# Patient Record
Sex: FEMALE | Race: BLACK OR AFRICAN AMERICAN | NOT HISPANIC OR LATINO | Employment: STUDENT | RURAL
[De-identification: names, ages, dates, MRNs, and addresses within clinical notes are randomized per-mention and may not be internally consistent; named-entity substitution may affect disease eponyms.]

---

## 2018-09-19 DIAGNOSIS — Z11.1 SCREENING-PULMONARY TB: Primary | ICD-10-CM

## 2018-09-23 LAB
GAMMA INTERFERON BACKGROUND BLD IA-ACNC: 0.04 IU/ML
M TB IFN-G BLD-IMP: NEGATIVE
M TB IFN-G CD4+ T-CELLS BLD-ACNC: 0.03 IU/ML
M TB IFN-G CD4+ T-CELLS BLD-ACNC: 0.04 IU/ML
MITOGEN IGNF BLD-ACNC: >10 IU/ML
QUANTIFERON INCUBATION: NORMAL
SERVICE CMNT-IMP: NORMAL

## 2019-04-11 DIAGNOSIS — Z11.1 SCREENING-PULMONARY TB: Primary | ICD-10-CM

## 2019-04-12 ENCOUNTER — RESULTS ENCOUNTER (OUTPATIENT)
Dept: FAMILY MEDICINE CLINIC | Facility: CLINIC | Age: 19
End: 2019-04-12

## 2019-04-12 DIAGNOSIS — Z11.1 SCREENING-PULMONARY TB: ICD-10-CM

## 2019-04-17 ENCOUNTER — TELEPHONE (OUTPATIENT)
Dept: FAMILY MEDICINE CLINIC | Facility: CLINIC | Age: 19
End: 2019-04-17

## 2019-04-17 PROBLEM — Z00.00 WELLNESS EXAMINATION: Status: ACTIVE | Noted: 2019-04-17

## 2019-04-17 PROBLEM — I38 VALVULAR HEART DISEASE: Status: ACTIVE | Noted: 2019-04-17

## 2019-04-17 LAB
GAMMA INTERFERON BACKGROUND BLD IA-ACNC: 0.03 IU/ML
M TB IFN-G BLD-IMP: NEGATIVE
M TB IFN-G CD4+ BCKGRND COR BLD-ACNC: 0.02 IU/ML
MITOGEN IGNF BLD-ACNC: 8.82 IU/ML
QUANTIFERON INCUBATION: NORMAL
QUANTIFERON TB2 AG VALUE: 0.02 IU/ML
SERVICE CMNT-IMP: NORMAL

## 2019-04-17 NOTE — TELEPHONE ENCOUNTER
Spoke with pt mother, informed of negative results and sent Cami an email verification to activate [facility] to view the results

## 2019-04-17 NOTE — TELEPHONE ENCOUNTER
----- Message from Esteban Wooten MD sent at 4/17/2019  8:01 AM CDT -----  This is neg  Not sure if she has an issue/not?  May just need copy of this for her work/?

## 2023-06-26 ENCOUNTER — TELEPHONE (OUTPATIENT)
Dept: FAMILY MEDICINE CLINIC | Facility: CLINIC | Age: 23
End: 2023-06-26

## 2024-02-09 ENCOUNTER — OFFICE VISIT (OUTPATIENT)
Dept: FAMILY MEDICINE CLINIC | Facility: CLINIC | Age: 24
End: 2024-02-09
Payer: COMMERCIAL

## 2024-02-09 VITALS
WEIGHT: 189.8 LBS | TEMPERATURE: 96.9 F | DIASTOLIC BLOOD PRESSURE: 90 MMHG | RESPIRATION RATE: 18 BRPM | HEIGHT: 72 IN | OXYGEN SATURATION: 98 % | HEART RATE: 84 BPM | SYSTOLIC BLOOD PRESSURE: 140 MMHG | BODY MASS INDEX: 25.71 KG/M2

## 2024-02-09 DIAGNOSIS — Z00.00 WELLNESS EXAMINATION: ICD-10-CM

## 2024-02-09 DIAGNOSIS — Z01.419 ROUTINE GYNECOLOGICAL EXAMINATION: ICD-10-CM

## 2024-02-09 DIAGNOSIS — F41.8 DEPRESSION WITH ANXIETY: Primary | ICD-10-CM

## 2024-02-09 RX ORDER — ESCITALOPRAM OXALATE 10 MG/1
10 TABLET ORAL DAILY
Qty: 30 TABLET | Refills: 5 | Status: SHIPPED | OUTPATIENT
Start: 2024-02-09

## 2024-02-09 RX ORDER — NORETHINDRONE ACETATE AND ETHINYL ESTRADIOL, ETHINYL ESTRADIOL AND FERROUS FUMARATE 1MG-10(24)
1 KIT ORAL DAILY
Qty: 28 TABLET | Refills: 5 | Status: SHIPPED | OUTPATIENT
Start: 2024-02-09

## 2024-02-10 LAB
ALBUMIN SERPL-MCNC: 5.1 G/DL (ref 3.5–5.2)
ALBUMIN/GLOB SERPL: 2 G/DL
ALP SERPL-CCNC: 48 U/L (ref 39–117)
ALT SERPL-CCNC: 15 U/L (ref 1–33)
AST SERPL-CCNC: 21 U/L (ref 1–32)
BASOPHILS # BLD AUTO: 0.05 10*3/MM3 (ref 0–0.2)
BASOPHILS NFR BLD AUTO: 0.7 % (ref 0–1.5)
BILIRUB SERPL-MCNC: 0.6 MG/DL (ref 0–1.2)
BUN SERPL-MCNC: 14 MG/DL (ref 6–20)
BUN/CREAT SERPL: 14.4 (ref 7–25)
CALCIUM SERPL-MCNC: 9.4 MG/DL (ref 8.6–10.5)
CHLORIDE SERPL-SCNC: 103 MMOL/L (ref 98–107)
CHOLEST SERPL-MCNC: 138 MG/DL (ref 0–200)
CO2 SERPL-SCNC: 24.9 MMOL/L (ref 22–29)
CREAT SERPL-MCNC: 0.97 MG/DL (ref 0.57–1)
EGFRCR SERPLBLD CKD-EPI 2021: 83.3 ML/MIN/1.73
EOSINOPHIL # BLD AUTO: 0.03 10*3/MM3 (ref 0–0.4)
EOSINOPHIL NFR BLD AUTO: 0.4 % (ref 0.3–6.2)
ERYTHROCYTE [DISTWIDTH] IN BLOOD BY AUTOMATED COUNT: 13 % (ref 12.3–15.4)
GLOBULIN SER CALC-MCNC: 2.6 GM/DL
GLUCOSE SERPL-MCNC: 70 MG/DL (ref 65–99)
HCT VFR BLD AUTO: 33.7 % (ref 34–46.6)
HDLC SERPL-MCNC: 68 MG/DL (ref 40–60)
HGB BLD-MCNC: 11.1 G/DL (ref 12–15.9)
IMM GRANULOCYTES # BLD AUTO: 0.01 10*3/MM3 (ref 0–0.05)
IMM GRANULOCYTES NFR BLD AUTO: 0.1 % (ref 0–0.5)
LDLC SERPL CALC-MCNC: 62 MG/DL (ref 0–100)
LYMPHOCYTES # BLD AUTO: 2.01 10*3/MM3 (ref 0.7–3.1)
LYMPHOCYTES NFR BLD AUTO: 26.2 % (ref 19.6–45.3)
MCH RBC QN AUTO: 31 PG (ref 26.6–33)
MCHC RBC AUTO-ENTMCNC: 32.9 G/DL (ref 31.5–35.7)
MCV RBC AUTO: 94.1 FL (ref 79–97)
MONOCYTES # BLD AUTO: 0.57 10*3/MM3 (ref 0.1–0.9)
MONOCYTES NFR BLD AUTO: 7.4 % (ref 5–12)
NEUTROPHILS # BLD AUTO: 4.99 10*3/MM3 (ref 1.7–7)
NEUTROPHILS NFR BLD AUTO: 65.2 % (ref 42.7–76)
NRBC BLD AUTO-RTO: 0 /100 WBC (ref 0–0.2)
PLATELET # BLD AUTO: 210 10*3/MM3 (ref 140–450)
POTASSIUM SERPL-SCNC: 4 MMOL/L (ref 3.5–5.2)
PROT SERPL-MCNC: 7.7 G/DL (ref 6–8.5)
RBC # BLD AUTO: 3.58 10*6/MM3 (ref 3.77–5.28)
SODIUM SERPL-SCNC: 139 MMOL/L (ref 136–145)
TRIGL SERPL-MCNC: 27 MG/DL (ref 0–150)
TSH SERPL DL<=0.005 MIU/L-ACNC: 3.81 UIU/ML (ref 0.27–4.2)
VLDLC SERPL CALC-MCNC: 8 MG/DL (ref 5–40)
WBC # BLD AUTO: 7.66 10*3/MM3 (ref 3.4–10.8)

## 2024-02-12 DIAGNOSIS — D64.9 ANEMIA, UNSPECIFIED TYPE: Primary | ICD-10-CM

## 2024-02-13 LAB
IRON SATN MFR SERPL: 15 % (ref 20–50)
IRON SERPL-MCNC: 57 MCG/DL (ref 37–145)
TIBC SERPL-MCNC: 389 MCG/DL
UIBC SERPL-MCNC: 332 MCG/DL (ref 112–346)
WRITTEN AUTHORIZATION: NORMAL

## 2024-02-15 ENCOUNTER — PATIENT MESSAGE (OUTPATIENT)
Dept: FAMILY MEDICINE CLINIC | Facility: CLINIC | Age: 24
End: 2024-02-15
Payer: COMMERCIAL

## 2024-02-16 LAB
FERRITIN SERPL-MCNC: 41.7 NG/ML (ref 13–150)
IRON SATN MFR SERPL: 10 % (ref 20–50)
IRON SERPL-MCNC: 34 MCG/DL (ref 37–145)
TIBC SERPL-MCNC: 358 MCG/DL
UIBC SERPL-MCNC: 324 MCG/DL (ref 112–346)

## 2024-02-16 RX ORDER — FERROUS SULFATE 325(65) MG
200 TABLET ORAL DAILY
Qty: 30 TABLET | Refills: 0 | Status: SHIPPED | OUTPATIENT
Start: 2024-02-16

## 2024-02-20 ENCOUNTER — OFFICE VISIT (OUTPATIENT)
Dept: OBSTETRICS AND GYNECOLOGY | Age: 24
End: 2024-02-20
Payer: COMMERCIAL

## 2024-02-20 VITALS
WEIGHT: 186 LBS | SYSTOLIC BLOOD PRESSURE: 142 MMHG | BODY MASS INDEX: 25.19 KG/M2 | DIASTOLIC BLOOD PRESSURE: 82 MMHG | HEIGHT: 72 IN

## 2024-02-20 DIAGNOSIS — Z01.419 WOMEN'S ANNUAL ROUTINE GYNECOLOGICAL EXAMINATION: Primary | ICD-10-CM

## 2024-02-20 DIAGNOSIS — Z12.4 PAP SMEAR FOR CERVICAL CANCER SCREENING: ICD-10-CM

## 2024-02-20 PROCEDURE — G0123 SCREEN CERV/VAG THIN LAYER: HCPCS | Performed by: NURSE PRACTITIONER

## 2024-02-20 PROCEDURE — 87624 HPV HI-RISK TYP POOLED RSLT: CPT | Performed by: NURSE PRACTITIONER

## 2024-02-20 NOTE — PROGRESS NOTES
Chief Complaint   Patient presents with    Gynecologic Exam     Patient is new to our office and here to establish care.  Patient is due for annual well GYN Exam.  Pt due for first pap. Pt on OCP Lo Loestrin x 1.5 weeks, rx by PCP for menorrhagia with regular cycle. Patient denies current pelvic pain, abnormal vaginal bleeding or discharge, urinary incontinence, and voices no other complaints. Pt has never been sexually active.         History:  Cami England is a 23 y.o. female who presents today for evaluation of the above problems.       Cami England is a 23 y.o. female ,  who comes to the office today for annual GYN examination. Last menstrual period was 01/25/2024. She reports periods are regular but Heavy bleeding for 7 days. This is her first pap. She has no history of cervical dysplasia. Not sexually active.   She is currently on OCP for menorrhagia. Just started.   Her medical history is reviewed.           ROS:  Review of Systems   Constitutional: Negative.    HENT: Negative.     Eyes: Negative.    Respiratory: Negative.     Cardiovascular: Negative.    Gastrointestinal: Negative.    Endocrine: Negative.    Genitourinary:  Positive for menstrual problem.   Musculoskeletal: Negative.    Skin: Negative.    Neurological: Negative.    Psychiatric/Behavioral:  The patient is nervous/anxious.        Allergies   Allergen Reactions    Amoxicillin Unknown (See Comments)    Nickel Itching and Rash     Past Medical History:   Diagnosis Date    Anxiety     Depression      Past Surgical History:   Procedure Laterality Date    TONSILLECTOMY      WISDOM TOOTH EXTRACTION       Family History   Problem Relation Age of Onset    Hyperlipidemia Father     Anxiety disorder Mother     Arthritis Mother     Depression Mother     Hyperlipidemia Mother     Diabetes Maternal Grandmother     Cancer Maternal Grandfather     Breast cancer Neg Hx     Ovarian cancer Neg Hx     Uterine cancer Neg Hx     Colon cancer Neg Hx      "Melanoma Neg Hx       reports that she has never smoked. She has never been exposed to tobacco smoke. She has never used smokeless tobacco. She reports current alcohol use of about 3.0 standard drinks of alcohol per week. She reports that she does not use drugs.      Current Outpatient Medications:     escitalopram (Lexapro) 10 MG tablet, Take 1 tablet by mouth Daily., Disp: 30 tablet, Rfl: 5    Ferrous Sulfate Dried (Feosol) 200 (65 Fe) MG tablet tablet, Take 1 tablet by mouth Daily., Disp: 30 tablet, Rfl: 0    neomycin-polymyxin-hydrocortisone (CORTISPORIN) 3.5-27517-5 otic solution, Administer 3 drops into the left ear 3 (Three) Times a Day., Disp: 10 mL, Rfl: 0    Norethin-Eth Estrad-Fe Biphas (Lo Loestrin Fe) 1 MG-10 MCG / 10 MCG tablet, Take 1 tablet by mouth Daily., Disp: 28 tablet, Rfl: 5    OBJECTIVE:  /82   Ht 182.9 cm (72\")   Wt 84.4 kg (186 lb)   LMP 01/25/2024 (Approximate)   BMI 25.23 kg/m²    Physical Exam  Exam conducted with a chaperone present.   Constitutional:       Appearance: She is well-developed.   HENT:      Head: Normocephalic and atraumatic.   Eyes:      General: Lids are normal.      Conjunctiva/sclera: Conjunctivae normal.      Pupils: Pupils are equal, round, and reactive to light.   Neck:      Thyroid: No thyromegaly.   Cardiovascular:      Rate and Rhythm: Normal rate and regular rhythm.      Heart sounds: Normal heart sounds.   Pulmonary:      Effort: Pulmonary effort is normal.      Breath sounds: Normal breath sounds.   Chest:   Breasts:     Breasts are symmetrical.      Right: No inverted nipple, mass, nipple discharge, skin change or tenderness.      Left: No inverted nipple, mass, nipple discharge, skin change or tenderness.   Abdominal:      General: Bowel sounds are normal.      Palpations: Abdomen is soft.   Genitourinary:     Exam position: Supine.      Labia:         Right: No rash, tenderness, lesion or injury.         Left: No rash, tenderness, lesion or injury. "       Vagina: No signs of injury and foreign body. No vaginal discharge, erythema, tenderness or bleeding.      Cervix: No cervical motion tenderness, discharge or friability.      Uterus: Not deviated, not enlarged, not fixed and not tender.       Adnexa:         Right: No mass, tenderness or fullness.          Left: No mass, tenderness or fullness.        Rectum: Normal. No tenderness or external hemorrhoid.   Musculoskeletal:         General: Normal range of motion.      Cervical back: Normal range of motion and neck supple.   Skin:     General: Skin is warm and dry.   Neurological:      Mental Status: She is alert and oriented to person, place, and time.         Assessment/Plan    Diagnoses and all orders for this visit:    1. Women's annual routine gynecological examination (Primary)  -     Liquid-based Pap Smear, Screening  Immunizations:      - Tetanus: Unknown or >10 years ago. Recommend to have at pharmacy or on injury.      - Influenza: recommended annually      - Pneumovax:once after age 65      - Prevnar: Once after age 65      - Zostavax: Once after age 60  Colon Cancer Screening: Due at 45  Mammogram: Due at 40.  PAP: done today   DEXA: DEXA scan at 65  COVID vaccine information is available at vaccine.[place].gov      2. Pap smear for cervical cancer screening  -     Liquid-based Pap Smear, Screening    She will return in one year. In the meantime if she develops questions or problems, she will notify the office.        An After Visit Summary was printed and given to the patient at discharge.  Return in about 1 year (around 2/20/2025) for Annual physical.          Naty VILLALOBOS 2/21/2024   Electronically signed

## 2024-02-22 LAB
GEN CATEG CVX/VAG CYTO-IMP: NORMAL
HPV I/H RISK 4 DNA CVX QL PROBE+SIG AMP: NOT DETECTED
LAB AP CASE REPORT: NORMAL
LAB AP GYN ADDITIONAL INFORMATION: NORMAL
LAB AP GYN OTHER FINDINGS: NORMAL
Lab: NORMAL
PATH INTERP SPEC-IMP: NORMAL
STAT OF ADQ CVX/VAG CYTO-IMP: NORMAL

## 2024-03-08 ENCOUNTER — OFFICE VISIT (OUTPATIENT)
Dept: FAMILY MEDICINE CLINIC | Facility: CLINIC | Age: 24
End: 2024-03-08
Payer: COMMERCIAL

## 2024-03-08 VITALS
HEIGHT: 72 IN | HEART RATE: 70 BPM | WEIGHT: 194 LBS | OXYGEN SATURATION: 98 % | DIASTOLIC BLOOD PRESSURE: 72 MMHG | SYSTOLIC BLOOD PRESSURE: 132 MMHG | TEMPERATURE: 97.1 F | BODY MASS INDEX: 26.28 KG/M2

## 2024-03-08 DIAGNOSIS — F41.8 DEPRESSION WITH ANXIETY: Primary | ICD-10-CM

## 2024-03-08 DIAGNOSIS — D64.9 ANEMIA, UNSPECIFIED TYPE: ICD-10-CM

## 2024-03-08 PROCEDURE — 99213 OFFICE O/P EST LOW 20 MIN: CPT | Performed by: NURSE PRACTITIONER

## 2024-03-08 RX ORDER — BUSPIRONE HYDROCHLORIDE 10 MG/1
10 TABLET ORAL 3 TIMES DAILY PRN
Qty: 90 TABLET | Refills: 5 | Status: SHIPPED | OUTPATIENT
Start: 2024-03-08

## 2024-03-08 NOTE — PROGRESS NOTES
Subjective   Chief Complaint:  Reevaluation after starting Lexapro.    History of Present Illness:  This 23 y.o. female was seen in the office today.  She presents today after starting Lexapro. She reports overall mood is better. 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    Review of Systems    Allergies   Allergen Reactions    Amoxicillin Unknown (See Comments)    Nickel Itching and Rash      Current Outpatient Medications on File Prior to Visit   Medication Sig    escitalopram (Lexapro) 10 MG tablet Take 1 tablet by mouth Daily.    Ferrous Sulfate Dried (Feosol) 200 (65 Fe) MG tablet tablet Take 1 tablet by mouth Daily.    neomycin-polymyxin-hydrocortisone (CORTISPORIN) 3.5-15829-4 otic solution Administer 3 drops into the left ear 3 (Three) Times a Day.    Norethin-Eth Estrad-Fe Biphas (Lo Loestrin Fe) 1 MG-10 MCG / 10 MCG tablet Take 1 tablet by mouth Daily.     No current facility-administered medications on file prior to visit.      Past Medical, Surgical, Social, and Family History:  Past Medical History:   Diagnosis Date    Anxiety     Depression      Past Surgical History:   Procedure Laterality Date    TONSILLECTOMY      WISDOM TOOTH EXTRACTION       Social History     Socioeconomic History    Marital status: Single   Tobacco Use    Smoking status: Never     Passive exposure: Never    Smokeless tobacco: Never   Vaping Use    Vaping status: Never Used   Substance and Sexual Activity    Alcohol use: Yes     Alcohol/week: 3.0 standard drinks of alcohol     Types: 3 Drinks containing 0.5 oz of alcohol per week    Drug use: Never    Sexual activity: Never     Family History   Problem Relation Age of Onset    Hyperlipidemia Father     Anxiety disorder Mother     Arthritis Mother     Depression Mother     Hyperlipidemia Mother     Diabetes Maternal Grandmother     Cancer Maternal Grandfather     Breast cancer Neg Hx     Ovarian cancer Neg Hx     Uterine cancer  "Neg Hx     Colon cancer Neg Hx     Melanoma Neg Hx        Prior Visit Notes/Records, Lab, Imaging, and Diagnostic Results Reviewed:  A1C:No results for input(s): \"HGBA1C\" in the last 76703 hours.  GLUCOSE:  Lab Results - Last 18 Months   Lab Units 02/09/24  0924   GLUCOSE mg/dL 70     LIPID:  Lab Results - Last 18 Months   Lab Units 02/09/24  0924   CHOLESTEROL mg/dL 138   LDL CHOL mg/dL 62   HDL CHOL mg/dL 68*   TRIGLYCERIDES mg/dL 27     PSA:No results for input(s): \"PSA\" in the last 77287 hours.  CBC:  Lab Results - Last 18 Months   Lab Units 02/15/24  0834 02/09/24  0924   WBC 10*3/mm3  --  7.66   HEMOGLOBIN g/dL  --  11.1*   HEMATOCRIT %  --  33.7*   PLATELETS 10*3/mm3  --  210   IRON mcg/dL 34* 57      BMP/CMP:  Lab Results - Last 18 Months   Lab Units 02/09/24 0924   SODIUM mmol/L 139   POTASSIUM mmol/L 4.0   CHLORIDE mmol/L 103   CO2 mmol/L 24.9   GLUCOSE mg/dL 70   BUN mg/dL 14   CREATININE mg/dL 0.97   EGFR RESULT mL/min/1.73 83.3   CALCIUM mg/dL 9.4     HEPATIC:  Lab Results - Last 18 Months   Lab Units 02/09/24  0924   ALT (SGPT) U/L 15   AST (SGOT) U/L 21   ALK PHOS U/L 48     Vit D:No results for input(s): \"HBGV79TV\" in the last 72895 hours.  THYROID:  Lab Results - Last 18 Months   Lab Units 02/09/24 0924   TSH uIU/mL 3.810     BMI Trend:  BMI Readings from Last 10 Encounters:   03/08/24 26.31 kg/m²   02/20/24 25.23 kg/m²   02/09/24 25.74 kg/m²   06/26/23 25.63 kg/m²     Objective   Vital Signs  /72   Pulse 70   Temp 97.1 °F (36.2 °C)   Ht 182.9 cm (72\")   Wt 88 kg (194 lb)   LMP 01/25/2024 (Approximate)   SpO2 98%   BMI 26.31 kg/m²   Physical Exam  Vitals reviewed.   Constitutional:       General: She is not in acute distress.     Appearance: Normal appearance.   Cardiovascular:      Rate and Rhythm: Normal rate and regular rhythm.   Pulmonary:      Effort: Pulmonary effort is normal.      Breath sounds: Normal breath sounds.   Psychiatric:      Comments: Openly voices feelings. "       Assessment & Plan   Diagnoses and all orders for this visit:    1. Depression with anxiety (Primary)    Other orders  -     busPIRone (BUSPAR) 10 MG tablet; Take 1 tablet by mouth 3 (Three) Times a Day As Needed (Anxiety).  Dispense: 90 tablet; Refill: 5    Discussions & Anticipatory Guidance:  Advised and educated plan of care.    The patient will Return for 6mo - jaqueline, 2 mo Lab.   Advised the patient let us do an add-on BuSpar as needed. Follow up in 6 months.    Electronically signed by GRISELDA Song 03/08/24, 9:17 AM CST.

## 2024-04-19 ENCOUNTER — PATIENT MESSAGE (OUTPATIENT)
Dept: FAMILY MEDICINE CLINIC | Facility: CLINIC | Age: 24
End: 2024-04-19
Payer: COMMERCIAL

## 2024-04-22 RX ORDER — ESCITALOPRAM OXALATE 10 MG/1
10 TABLET ORAL DAILY
Qty: 30 TABLET | Refills: 5 | Status: SHIPPED | OUTPATIENT
Start: 2024-04-22

## 2024-06-13 ENCOUNTER — OFFICE VISIT (OUTPATIENT)
Dept: OBSTETRICS AND GYNECOLOGY | Age: 24
End: 2024-06-13
Payer: COMMERCIAL

## 2024-06-13 VITALS
HEIGHT: 72 IN | WEIGHT: 209 LBS | DIASTOLIC BLOOD PRESSURE: 88 MMHG | SYSTOLIC BLOOD PRESSURE: 148 MMHG | BODY MASS INDEX: 28.31 KG/M2

## 2024-06-13 DIAGNOSIS — Z30.014 ENCOUNTER FOR INITIAL PRESCRIPTION OF INTRAUTERINE CONTRACEPTIVE DEVICE (IUD): Primary | ICD-10-CM

## 2024-06-13 NOTE — PROGRESS NOTES
Chief Complaint   Patient presents with    Contraception     Patient is here wanting to discuss birth control options. Has been taking Lo Loestrin OCP, but was having BTB due to switching shifts at work. Would like to consider Mirena IUD.          History:  Cami England is a 23 y.o. female who presents today for follow-up for evaluation of the above:    HPI    Patient presents today to discuss contraception options. She was started on OCP by PCP in Feb. Not currently taking. Has trouble taking a pill at the same time every day due to work schedule  She is also having two periods per month. She is interested in a Mirena IUD.        ROS:  Review of Systems   Constitutional: Negative.    HENT: Negative.     Eyes: Negative.    Respiratory: Negative.     Cardiovascular: Negative.    Gastrointestinal: Negative.    Endocrine: Negative.    Genitourinary: Negative.  Positive for menstrual problem.   Musculoskeletal: Negative.    Skin: Negative.    Neurological: Negative.    Psychiatric/Behavioral: Negative.         Ms. England  reports that she has never smoked. She has never been exposed to tobacco smoke. She has never used smokeless tobacco. She reports current alcohol use of about 3.0 standard drinks of alcohol per week. She reports that she does not use drugs.      Current Outpatient Medications:     busPIRone (BUSPAR) 10 MG tablet, Take 1 tablet by mouth 3 (Three) Times a Day As Needed (Anxiety)., Disp: 90 tablet, Rfl: 5    escitalopram (Lexapro) 10 MG tablet, Take 1 tablet by mouth Daily., Disp: 30 tablet, Rfl: 5    Levonorgestrel (MIRENA) 20 MCG/DAY intrauterine device IUD, To be inserted one time by prescriber. Route intrauterine., Disp: 1 each, Rfl: 0    neomycin-polymyxin-hydrocortisone (CORTISPORIN) 3.5-80561-2 otic solution, Administer 3 drops into the left ear 3 (Three) Times a Day. (Patient not taking: Reported on 6/13/2024), Disp: 10 mL, Rfl: 0    Norethin-Eth Estrad-Fe Biphas (Lo Loestrin Fe) 1 MG-10  "MCG / 10 MCG tablet, Take 1 tablet by mouth Daily. (Patient not taking: Reported on 6/13/2024), Disp: 28 tablet, Rfl: 5      OBJECTIVE:  /88   Ht 182.9 cm (72\")   Wt 94.8 kg (209 lb)   LMP 06/01/2024 (Approximate)   BMI 28.35 kg/m²    Physical Exam  Constitutional:       Appearance: She is not ill-appearing.   Pulmonary:      Effort: No respiratory distress.   Neurological:      Mental Status: She is oriented to person, place, and time.   Psychiatric:         Behavior: Behavior normal.         Assessment/Plan    Diagnoses and all orders for this visit:    1. Encounter for initial prescription of intrauterine contraceptive device (IUD) (Primary)  -     Levonorgestrel (MIRENA) 20 MCG/DAY intrauterine device IUD; To be inserted one time by prescriber. Route intrauterine.  Dispense: 1 each; Refill: 0    Discussed taking motrin prior to her insertion visit.   Advised to prevent pregnancy until device can be placed.      An After Visit Summary was printed and given to the patient at discharge.  Return for iud insertion. Sooner if problems arise.          Naty VILLALOBOS. 6/14/2024   Electronically Signed  "

## 2024-06-28 ENCOUNTER — PROCEDURE VISIT (OUTPATIENT)
Dept: OBSTETRICS AND GYNECOLOGY | Age: 24
End: 2024-06-28
Payer: COMMERCIAL

## 2024-06-28 VITALS
WEIGHT: 212 LBS | BODY MASS INDEX: 28.71 KG/M2 | HEIGHT: 72 IN | SYSTOLIC BLOOD PRESSURE: 126 MMHG | DIASTOLIC BLOOD PRESSURE: 80 MMHG

## 2024-06-28 DIAGNOSIS — Z30.430 ENCOUNTER FOR IUD INSERTION: Primary | ICD-10-CM

## 2024-06-28 DIAGNOSIS — Z11.3 SCREENING EXAMINATION FOR STD (SEXUALLY TRANSMITTED DISEASE): ICD-10-CM

## 2024-06-28 LAB
B-HCG UR QL: NEGATIVE
EXPIRATION DATE: NORMAL
INTERNAL NEGATIVE CONTROL: NEGATIVE
INTERNAL POSITIVE CONTROL: POSITIVE
Lab: NORMAL

## 2024-06-28 NOTE — PROGRESS NOTES
"Cami England is a 24 y.o. female  is here for IUD insertion.  Last menstrual period was 2024.  Her last Pap smear was 2024 and normal.  She has no history of cervical dysplasia.    /80   Ht 182.9 cm (72.01\")   Wt 96.2 kg (212 lb)   LMP 2024 (Approximate)   BMI 28.75 kg/m²      A urine pregnancy test in the office today is negative.    We have discussed the IUD, common side effects, and potential adverse events like uterine perforation, infection, or expulsion.  She has signed the consent form after answering her questions. A verbal timeout was completed with myself and the patient prior to the beginning of the procedure to verify correct patient, , and procedure to be completed. All information was verified correctly.     MIRENA IUD lot number [id number]  was placed today.  The cervix was visualized and a sample was obtained for gonorrhea and chlamydia testing. The cervix was then cleansed with BETADINE swabs.  The anterior lip was grasped with a single-tooth tenaculum.  The uterus sounded to 8 cm.  The IUD was placed at the uterine fundus using sterile technique in a standard fashion.  The applicator was removed and the strings trimmed to 3 cm length.  The tenaculum site was made hemostatic with silver nitrate sticks. She tolerated the procedure well.    Assessment: IUD placement.    Cami England will return in one month for an IUD check.  She is given instructions to call if she has any fevers, heavy bleeding, severe pain, or nausea.      GRISELDA Gilbert  "

## 2024-06-28 NOTE — PROGRESS NOTES
Subjective   Cami Englnad is a 24 y.o. female.     History of Present Illness  The patient presents to the office today for Mirena IUD insertion. She is requesting STD screening via blood work.  Contraception  This is a new problem. The current episode started today. The problem has been unchanged. Pertinent negatives include no abdominal pain (no pain prior to procedure), anorexia, arthralgias, change in bowel habit, chest pain, chills, congestion, coughing, diaphoresis, fatigue, fever, headaches, joint swelling, myalgias, nausea, neck pain, numbness, rash, sore throat, swollen glands, urinary symptoms, vertigo, visual change, vomiting or weakness. Nothing aggravates the symptoms. She has tried NSAIDs for the symptoms. The treatment provided mild relief.            Review of Systems   Constitutional: Negative.  Negative for chills, diaphoresis, fatigue and fever.   HENT: Negative.  Negative for congestion, sore throat and swollen glands.    Eyes: Negative.    Respiratory: Negative.  Negative for cough.    Cardiovascular: Negative.  Negative for chest pain.   Gastrointestinal: Negative.  Negative for abdominal pain (no pain prior to procedure), anorexia, change in bowel habit, nausea and vomiting.   Endocrine: Negative.    Genitourinary:  Positive for menstrual problem.   Musculoskeletal: Negative.  Negative for arthralgias, joint swelling, myalgias and neck pain.   Skin: Negative.  Negative for rash.   Allergic/Immunologic: Negative.    Neurological: Negative.  Negative for vertigo, weakness and numbness.   Hematological: Negative.    Psychiatric/Behavioral: Negative.         Objective   Physical Exam  Vitals and nursing note reviewed. Exam conducted with a chaperone present.   Constitutional:       Appearance: She is well-developed.   HENT:      Head: Normocephalic and atraumatic.   Abdominal:      General: There is no distension.      Palpations: Abdomen is soft.      Tenderness: There is no abdominal  tenderness.   Genitourinary:     General: Normal vulva.      Exam position: Lithotomy position.      Labia:         Right: No rash, tenderness, lesion or injury.         Left: No rash, tenderness, lesion or injury.       Vagina: Normal. No vaginal discharge, erythema or tenderness.      Cervix: No cervical motion tenderness, discharge or friability.      Uterus: Not deviated, not enlarged and not tender.       Adnexa:         Right: No mass, tenderness or fullness.          Left: No mass, tenderness or fullness.     Skin:     General: Skin is warm and dry.   Neurological:      Mental Status: She is alert and oriented to person, place, and time.   Psychiatric:         Behavior: Behavior normal.         Thought Content: Thought content normal.         Judgment: Judgment normal.         Assessment & Plan   Diagnoses and all orders for this visit:    1. Encounter for IUD insertion (Primary)  Comments:  The patient presents to the office today for Mirena IUD insertion. UPT in office negative. After care with precautions discussed, pt v/u. 4 week f/u with ov/us.  Orders:  -     POC Pregnancy, Urine  -     Chlamydia trachomatis, Neisseria gonorrhoeae, PCR w/ confirmation - Swab, Cervix    2. Screening examination for STD (sexually transmitted disease)  Comments:  Patient is requesting STD screening via blood today.  Orders:  -     Chlamydia trachomatis, Neisseria gonorrhoeae, PCR w/ confirmation - Swab, Cervix  -     Hepatitis Panel, Acute  -     HIV-1 / O / 2 Ag / Antibody  -     RPR  -     HSV 1 & 2 - Specific Antibody, IgG       BMI is >= 25 and <30. (Overweight) The following options were offered after discussion;: information on healthy weight added to patient's after visit summary        GRISELDA Pierce

## 2024-07-02 LAB
C TRACH RRNA SPEC QL NAA+PROBE: NEGATIVE
N GONORRHOEA RRNA SPEC QL NAA+PROBE: NEGATIVE

## 2024-07-15 RX ORDER — BUSPIRONE HYDROCHLORIDE 10 MG/1
10 TABLET ORAL 3 TIMES DAILY PRN
Qty: 90 TABLET | Refills: 5 | Status: SHIPPED | OUTPATIENT
Start: 2024-07-15

## 2024-07-23 ENCOUNTER — OFFICE VISIT (OUTPATIENT)
Dept: OBSTETRICS AND GYNECOLOGY | Age: 24
End: 2024-07-23
Payer: COMMERCIAL

## 2024-07-23 VITALS
WEIGHT: 212 LBS | BODY MASS INDEX: 28.71 KG/M2 | DIASTOLIC BLOOD PRESSURE: 78 MMHG | HEIGHT: 72 IN | SYSTOLIC BLOOD PRESSURE: 114 MMHG

## 2024-07-23 DIAGNOSIS — Z30.431 SURVEILLANCE OF (INTRAUTERINE) CONTRACEPTIVE DEVICE: Primary | ICD-10-CM

## 2024-07-23 PROCEDURE — 99213 OFFICE O/P EST LOW 20 MIN: CPT

## 2024-07-23 NOTE — PROGRESS NOTES
"Chief Complaint   Patient presents with    Contraception     Patient here today for IUD check. US done in office.        History:  Cami England is a 24 y.o. female who presents today for follow-up for evaluation of the above:  The patient returns to the office today for follow up on recent IUD insertion with ultrasound.        ROS:  Review of Systems   Constitutional: Negative.    HENT: Negative.     Eyes: Negative.    Respiratory: Negative.     Cardiovascular: Negative.    Gastrointestinal: Negative.    Endocrine: Negative.    Genitourinary: Negative.    Musculoskeletal: Negative.    Skin: Negative.    Allergic/Immunologic: Negative.    Neurological: Negative.    Hematological: Negative.    Psychiatric/Behavioral: Negative.         Ms. England  reports that she has never smoked. She has never been exposed to tobacco smoke. She has never used smokeless tobacco. She reports current alcohol use of about 3.0 standard drinks of alcohol per week. She reports that she does not use drugs.      Current Outpatient Medications:     busPIRone (BUSPAR) 10 MG tablet, Take 1 tablet by mouth 3 (Three) Times a Day As Needed (Anxiety)., Disp: 90 tablet, Rfl: 5    escitalopram (Lexapro) 10 MG tablet, Take 1 tablet by mouth Daily., Disp: 30 tablet, Rfl: 5    Levonorgestrel (MIRENA) 20 MCG/DAY intrauterine device IUD, To be inserted one time by prescriber. Route intrauterine., Disp: 1 each, Rfl: 0    neomycin-polymyxin-hydrocortisone (CORTISPORIN) 3.5-15020-0 otic solution, Administer 3 drops into the left ear 3 (Three) Times a Day., Disp: 10 mL, Rfl: 0      OBJECTIVE:  /78   Ht 182.9 cm (72.01\")   Wt 96.2 kg (212 lb)   LMP 07/16/2024 (Approximate)   BMI 28.75 kg/m²    Physical Exam  Vitals and nursing note reviewed.   Constitutional:       General: She is not in acute distress.     Appearance: Normal appearance. She is not ill-appearing or diaphoretic.   HENT:      Head: Normocephalic and atraumatic.   Pulmonary:     "  Effort: Pulmonary effort is normal. No respiratory distress.   Musculoskeletal:         General: No deformity.      Cervical back: Normal range of motion.   Skin:     Coloration: Skin is not pale.   Neurological:      General: No focal deficit present.      Mental Status: She is alert.   Psychiatric:         Mood and Affect: Mood normal.         Behavior: Behavior normal.         Thought Content: Thought content normal.         Judgment: Judgment normal.       Assessment/Plan    Diagnoses and all orders for this visit:    1. Surveillance of (intrauterine) contraceptive device (Primary)  Comments:  The patient presents to the office today to follow up on recent IUD insertion with ultrasound. The patient reports she has light bleeding daily with some mild abdominal cramping. Ultrasound does show IUD in normal placement. We did discuss that by 4 months if bleeding has not significantly reduced, or she is experiencing frequent spotting, 2-3 months of ocp may be considered to help regulate cycle and decrease bleeding. She will return for her next scheduled appointment.          An After Visit Summary was printed and given to the patient at discharge.  Return for Next scheduled follow up. Sooner if problems arise.          Lakeshia Soliz, APRN

## 2024-07-30 LAB
HAV IGM SERPL QL IA: NEGATIVE
HBV CORE IGM SERPL QL IA: NEGATIVE
HBV SURFACE AG SERPL QL IA: NEGATIVE
HCV AB SERPL QL IA: NORMAL
HCV IGG SERPL QL IA: NON REACTIVE
HIV 1+2 AB+HIV1 P24 AG SERPL QL IA: NON REACTIVE
HSV1 IGG SER IA-ACNC: <0.91 INDEX (ref 0–0.9)
HSV2 IGG SER IA-ACNC: <0.91 INDEX (ref 0–0.9)
RPR SER QL: NON REACTIVE

## 2024-09-05 ENCOUNTER — OFFICE VISIT (OUTPATIENT)
Dept: FAMILY MEDICINE CLINIC | Facility: CLINIC | Age: 24
End: 2024-09-05
Payer: COMMERCIAL

## 2024-09-05 VITALS
SYSTOLIC BLOOD PRESSURE: 122 MMHG | HEIGHT: 72 IN | BODY MASS INDEX: 29.42 KG/M2 | WEIGHT: 217.2 LBS | HEART RATE: 88 BPM | OXYGEN SATURATION: 99 % | DIASTOLIC BLOOD PRESSURE: 78 MMHG

## 2024-09-05 DIAGNOSIS — F41.9 ANXIETY: ICD-10-CM

## 2024-09-05 DIAGNOSIS — E61.1 IRON DEFICIENCY: ICD-10-CM

## 2024-09-05 DIAGNOSIS — F33.41 RECURRENT MAJOR DEPRESSIVE DISORDER, IN PARTIAL REMISSION: Primary | ICD-10-CM

## 2024-09-05 PROCEDURE — 99214 OFFICE O/P EST MOD 30 MIN: CPT | Performed by: NURSE PRACTITIONER

## 2024-09-05 NOTE — PROGRESS NOTES
"Subjective   Chief Complaint:  Evaluation of mood, reevaluation of iron levels    History of Present Illness:  This 24 y.o. female presents today for evaluation of mood and recheck of iron stores.  Has had a iron deficiency anemia prior.  Reports cycles are getting regulated-recent IUD placement.  Reports depression with anxiety doing well on current meds which is Lexapro and BuSpar.    Past Medical, Surgical, Social, and Family History:  Allergies   Allergen Reactions    Amoxicillin Unknown (See Comments)    Nickel Itching and Rash      Past Medical History:   Diagnosis Date    Anxiety     Depression       Past Surgical History:   Procedure Laterality Date    TONSILLECTOMY      WISDOM TOOTH EXTRACTION        Social History     Socioeconomic History    Marital status: Single   Tobacco Use    Smoking status: Never     Passive exposure: Never    Smokeless tobacco: Never   Vaping Use    Vaping status: Never Used   Substance and Sexual Activity    Alcohol use: Yes     Alcohol/week: 3.0 standard drinks of alcohol     Types: 3 Drinks containing 0.5 oz of alcohol per week    Drug use: Never    Sexual activity: Yes     Partners: Male     Birth control/protection: Condom      Family History   Problem Relation Age of Onset    Hyperlipidemia Father     Anxiety disorder Mother     Arthritis Mother     Depression Mother     Hyperlipidemia Mother     Diabetes Maternal Grandmother     Cancer Maternal Grandfather     Breast cancer Neg Hx     Ovarian cancer Neg Hx     Uterine cancer Neg Hx     Colon cancer Neg Hx     Melanoma Neg Hx        Objective   Vital Signs  /78   Pulse 88   Ht 182.9 cm (72.01\")   Wt 98.5 kg (217 lb 3.2 oz)   SpO2 99%   BMI 29.45 kg/m²    Physical Exam  Vitals reviewed.   Constitutional:       General: She is not in acute distress.     Appearance: Normal appearance.   Cardiovascular:      Rate and Rhythm: Normal rate and regular rhythm.   Pulmonary:      Effort: Pulmonary effort is normal.      " Breath sounds: Normal breath sounds.   Psychiatric:         Mood and Affect: Mood normal.         Behavior: Behavior normal.         Thought Content: Thought content normal.         Judgment: Judgment normal.       Assessment & Plan   Diagnoses and all orders for this visit:    1. Recurrent major depressive disorder, in partial remission (Primary)  Comments:  Chronic/stable-continue Lexapro    2. Iron deficiency  Comments:  Chronic-due for lab  Orders:  -     CBC & Differential  -     Iron Profile  -     Ferritin    3. Anxiety  Comments:  Chronic/stable-continue BuSpar    Plan:  Advised and educated plan of care.      Follow-up:  The patient will Return in about 6 months (around 3/5/2025) for follow-Up.    Records and Results Reviewed:  I reviewed current medications as given by patient and allergy list    Electronically signed by GRISELDA Song, 09/05/24, 8:16 AM CDT.

## 2024-09-06 ENCOUNTER — LAB (OUTPATIENT)
Dept: LAB | Facility: HOSPITAL | Age: 24
End: 2024-09-06
Payer: COMMERCIAL

## 2024-09-06 PROCEDURE — 83540 ASSAY OF IRON: CPT | Performed by: NURSE PRACTITIONER

## 2024-09-06 PROCEDURE — 85025 COMPLETE CBC W/AUTO DIFF WBC: CPT | Performed by: NURSE PRACTITIONER

## 2024-09-06 PROCEDURE — 82728 ASSAY OF FERRITIN: CPT | Performed by: NURSE PRACTITIONER

## 2024-09-06 PROCEDURE — 84466 ASSAY OF TRANSFERRIN: CPT | Performed by: NURSE PRACTITIONER

## 2024-10-16 RX ORDER — ESCITALOPRAM OXALATE 10 MG/1
10 TABLET ORAL DAILY
Qty: 30 TABLET | Refills: 5 | Status: SHIPPED | OUTPATIENT
Start: 2024-10-16

## 2024-12-06 ENCOUNTER — OFFICE VISIT (OUTPATIENT)
Dept: FAMILY MEDICINE CLINIC | Facility: CLINIC | Age: 24
End: 2024-12-06
Payer: COMMERCIAL

## 2024-12-06 ENCOUNTER — HOSPITAL ENCOUNTER (OUTPATIENT)
Dept: GENERAL RADIOLOGY | Facility: HOSPITAL | Age: 24
Discharge: HOME | End: 2024-12-06
Admitting: NURSE PRACTITIONER
Payer: COMMERCIAL

## 2024-12-06 ENCOUNTER — PATIENT MESSAGE (OUTPATIENT)
Dept: FAMILY MEDICINE CLINIC | Facility: CLINIC | Age: 24
End: 2024-12-06

## 2024-12-06 VITALS
DIASTOLIC BLOOD PRESSURE: 76 MMHG | OXYGEN SATURATION: 97 % | BODY MASS INDEX: 31.56 KG/M2 | HEIGHT: 72 IN | WEIGHT: 233 LBS | HEART RATE: 77 BPM | SYSTOLIC BLOOD PRESSURE: 128 MMHG

## 2024-12-06 DIAGNOSIS — H66.91 OTITIS, RIGHT: ICD-10-CM

## 2024-12-06 DIAGNOSIS — E61.1 IRON DEFICIENCY: ICD-10-CM

## 2024-12-06 DIAGNOSIS — M25.561 CHRONIC PAIN OF RIGHT KNEE: ICD-10-CM

## 2024-12-06 DIAGNOSIS — G89.29 CHRONIC PAIN OF RIGHT KNEE: ICD-10-CM

## 2024-12-06 DIAGNOSIS — M25.561 CHRONIC PAIN OF RIGHT KNEE: Primary | ICD-10-CM

## 2024-12-06 DIAGNOSIS — F33.41 RECURRENT MAJOR DEPRESSIVE DISORDER, IN PARTIAL REMISSION: Primary | ICD-10-CM

## 2024-12-06 DIAGNOSIS — R45.4 IRRITABILITY: ICD-10-CM

## 2024-12-06 DIAGNOSIS — G89.29 CHRONIC PAIN OF RIGHT KNEE: Primary | ICD-10-CM

## 2024-12-06 PROCEDURE — 73562 X-RAY EXAM OF KNEE 3: CPT

## 2024-12-06 PROCEDURE — 99214 OFFICE O/P EST MOD 30 MIN: CPT | Performed by: NURSE PRACTITIONER

## 2024-12-06 RX ORDER — FERROUS SULFATE 325(65) MG
325 TABLET ORAL
COMMUNITY

## 2024-12-06 RX ORDER — ESCITALOPRAM OXALATE 20 MG/1
20 TABLET ORAL DAILY
Qty: 30 TABLET | Refills: 12 | Status: SHIPPED | OUTPATIENT
Start: 2024-12-06

## 2024-12-06 RX ORDER — NEOMYCIN SULFATE, POLYMYXIN B SULFATE AND HYDROCORTISONE 10; 3.5; 1 MG/ML; MG/ML; [USP'U]/ML
3 SUSPENSION/ DROPS AURICULAR (OTIC) 3 TIMES DAILY
Qty: 10 ML | Refills: 0 | Status: SHIPPED | OUTPATIENT
Start: 2024-12-06

## 2024-12-06 NOTE — PROGRESS NOTES
Reviewed results - Reelationt message sent.  If not seen in 3 days (3 day alert set), will send to pool to call the message.      Electronically signed by GRISELDA Song, 12/06/24, 8:58 AM CST.

## 2024-12-06 NOTE — PROGRESS NOTES
"Subjective   Chief Complaint:  Reevaluation of mood    History of Present Illness  The patient is a 24-year-old female presenting for reevaluation of mood.    She reports that Lexapro 10 mg is no longer effective, leading to increased irritability and depression symptoms. She denies any thoughts of self-harm or harm to others.    She has a history of iron deficiency and is interested in rechecking her iron levels. She is currently on oral iron supplementation.    She also reports chronic pain in her right knee that has been present for over 1 year. The pain has progressively worsened and is now a \"gravel sensation\".    She also reports soreness in her right ear after ear washing.    Past Medical, Surgical, Social, and Family History:  Allergies   Allergen Reactions    Amoxicillin Unknown (See Comments)    Nickel Itching and Rash      Past Medical History:   Diagnosis Date    Anxiety     Depression       Past Surgical History:   Procedure Laterality Date    TONSILLECTOMY      WISDOM TOOTH EXTRACTION        Social History     Socioeconomic History    Marital status: Single   Tobacco Use    Smoking status: Never     Passive exposure: Never    Smokeless tobacco: Never   Vaping Use    Vaping status: Never Used   Substance and Sexual Activity    Alcohol use: Yes     Alcohol/week: 3.0 standard drinks of alcohol     Types: 3 Drinks containing 0.5 oz of alcohol per week    Drug use: Never    Sexual activity: Yes     Partners: Male     Birth control/protection: Condom      Family History   Problem Relation Age of Onset    Hyperlipidemia Father     Anxiety disorder Mother     Arthritis Mother     Depression Mother     Hyperlipidemia Mother     Diabetes Maternal Grandmother     Cancer Maternal Grandfather     Breast cancer Neg Hx     Ovarian cancer Neg Hx     Uterine cancer Neg Hx     Colon cancer Neg Hx     Melanoma Neg Hx        Objective   Vital Signs  /76   Pulse 77   Ht 182.9 cm (72.01\")   Wt 106 kg (233 lb)   " SpO2 97%   BMI 31.59 kg/m²    Physical Exam  Vitals reviewed.   Constitutional:       General: She is not in acute distress.     Appearance: Normal appearance. She is obese.   HENT:      Ears:      Comments: Redness in canal, no TM involvement  Cardiovascular:      Rate and Rhythm: Normal rate and regular rhythm.   Pulmonary:      Effort: Pulmonary effort is normal.      Breath sounds: Normal breath sounds.   Musculoskeletal:         General: No swelling or deformity.      Comments: High level crepitus with flexion and extension   Psychiatric:      Comments: Openly voices feelings       Assessment & Plan   Assessment & Plan  1. Recurrent major depression.  Lexapro 10 mg is no longer effective. Titrate Lexapro to 20 mg orally daily. A short interval follow-up is recommended.    2. Iron deficiency.  Continue oral iron supplementation. Ferritin and iron levels will be checked today.    3. Irritability.  Order CBC, CMP, and TSH to evaluate underlying causes.    4. Chronic pain of right knee.  The pain has progressively worsened over the past year with a gravel sensation. An x-ray of the right knee will be ordered.    5. Otitis, right.  Reports soreness in the right ear after ear washing. Cortisporin 3 drops three times a day for 7 days is prescribed.        Follow-up:  The patient will Return for 1 month reevaluation of mood.    Records and Results Reviewed:  I reviewed current medications as given by patient and allergy list    : Hybrid LAURA Co- and Dragon Speech Recognition - No recording technology was used in the exam room during encounter.    Electronically signed by GRISELDA Song, 12/06/24, 8:52 AM CST.

## 2024-12-07 LAB
ALBUMIN SERPL-MCNC: 4.5 G/DL (ref 4–5)
ALP SERPL-CCNC: 67 IU/L (ref 44–121)
ALT SERPL-CCNC: 23 IU/L (ref 0–32)
AST SERPL-CCNC: 30 IU/L (ref 0–40)
BASOPHILS # BLD AUTO: 0.1 X10E3/UL (ref 0–0.2)
BASOPHILS NFR BLD AUTO: 1 %
BILIRUB SERPL-MCNC: 0.4 MG/DL (ref 0–1.2)
BUN SERPL-MCNC: 14 MG/DL (ref 6–20)
BUN/CREAT SERPL: 15 (ref 9–23)
CALCIUM SERPL-MCNC: 9.3 MG/DL (ref 8.7–10.2)
CHLORIDE SERPL-SCNC: 100 MMOL/L (ref 96–106)
CO2 SERPL-SCNC: 24 MMOL/L (ref 20–29)
CREAT SERPL-MCNC: 0.91 MG/DL (ref 0.57–1)
EGFRCR SERPLBLD CKD-EPI 2021: 89 ML/MIN/1.73
EOSINOPHIL # BLD AUTO: 0.1 X10E3/UL (ref 0–0.4)
EOSINOPHIL NFR BLD AUTO: 1 %
ERYTHROCYTE [DISTWIDTH] IN BLOOD BY AUTOMATED COUNT: 12.9 % (ref 11.7–15.4)
FERRITIN SERPL-MCNC: 84 NG/ML (ref 15–150)
GLOBULIN SER CALC-MCNC: 2.8 G/DL (ref 1.5–4.5)
GLUCOSE SERPL-MCNC: 88 MG/DL (ref 70–99)
HBA1C MFR BLD: 5.5 % (ref 4.8–5.6)
HCT VFR BLD AUTO: 39.8 % (ref 34–46.6)
HGB BLD-MCNC: 13.4 G/DL (ref 11.1–15.9)
IMM GRANULOCYTES # BLD AUTO: 0 X10E3/UL (ref 0–0.1)
IMM GRANULOCYTES NFR BLD AUTO: 0 %
IRON SATN MFR SERPL: 16 % (ref 15–55)
IRON SERPL-MCNC: 47 UG/DL (ref 27–159)
LYMPHOCYTES # BLD AUTO: 2 X10E3/UL (ref 0.7–3.1)
LYMPHOCYTES NFR BLD AUTO: 24 %
MCH RBC QN AUTO: 31.8 PG (ref 26.6–33)
MCHC RBC AUTO-ENTMCNC: 33.7 G/DL (ref 31.5–35.7)
MCV RBC AUTO: 94 FL (ref 79–97)
MONOCYTES # BLD AUTO: 0.6 X10E3/UL (ref 0.1–0.9)
MONOCYTES NFR BLD AUTO: 7 %
NEUTROPHILS # BLD AUTO: 5.6 X10E3/UL (ref 1.4–7)
NEUTROPHILS NFR BLD AUTO: 67 %
PLATELET # BLD AUTO: 220 X10E3/UL (ref 150–450)
POTASSIUM SERPL-SCNC: 4.3 MMOL/L (ref 3.5–5.2)
PROT SERPL-MCNC: 7.3 G/DL (ref 6–8.5)
RBC # BLD AUTO: 4.22 X10E6/UL (ref 3.77–5.28)
SODIUM SERPL-SCNC: 138 MMOL/L (ref 134–144)
TIBC SERPL-MCNC: 301 UG/DL (ref 250–450)
TSH SERPL DL<=0.005 MIU/L-ACNC: 3.27 UIU/ML (ref 0.45–4.5)
UIBC SERPL-MCNC: 254 UG/DL (ref 131–425)
WBC # BLD AUTO: 8.3 X10E3/UL (ref 3.4–10.8)

## 2024-12-09 NOTE — PROGRESS NOTES
Reviewed results - Kochzaubert message sent.  If not seen in 3 days (3 day alert set), will send to pool to call the message.      Electronically signed by GRISELDA Song, 12/09/24, 6:46 AM CST.

## 2024-12-11 ENCOUNTER — TELEPHONE (OUTPATIENT)
Dept: FAMILY MEDICINE CLINIC | Facility: CLINIC | Age: 24
End: 2024-12-11

## 2024-12-11 NOTE — TELEPHONE ENCOUNTER
Caller: Cami England    Relationship: Self    Best call back number: [phone]     What is the best time to reach you: ANYTIME    Who are you requesting to speak with (clinical staff, provider,  specific staff member): CLINICAL    Do you know the name of the person who called: CAMI    What was the call regarding: CAMI IS RETURNING YOUR CALL REGARDING RESULTS AND SAID YOU CAN LEAVE A MESSAGE IF SHE DOESN'T ANSWER BECAUSE SHE'S AT WORK.    Is it okay if the provider responds through G5hart: NO

## 2024-12-16 ENCOUNTER — OFFICE VISIT (OUTPATIENT)
Age: 24
End: 2024-12-16
Payer: COMMERCIAL

## 2024-12-16 VITALS — BODY MASS INDEX: 31.56 KG/M2 | WEIGHT: 233 LBS | HEIGHT: 72 IN

## 2024-12-16 DIAGNOSIS — M25.561 CHRONIC PAIN OF RIGHT KNEE: Primary | ICD-10-CM

## 2024-12-16 DIAGNOSIS — M22.2X1 PATELLOFEMORAL SYNDROME, RIGHT: ICD-10-CM

## 2024-12-16 DIAGNOSIS — G89.29 CHRONIC PAIN OF RIGHT KNEE: Primary | ICD-10-CM

## 2024-12-16 PROCEDURE — 99203 OFFICE O/P NEW LOW 30 MIN: CPT | Performed by: PHYSICIAN ASSISTANT

## 2024-12-16 RX ORDER — MELOXICAM 15 MG/1
15 TABLET ORAL DAILY
Qty: 14 TABLET | Refills: 0 | Status: SHIPPED | OUTPATIENT
Start: 2024-12-16 | End: 2025-01-02

## 2024-12-16 NOTE — PROGRESS NOTES
[facility]  Will Peters MD, PhD  Bigg Peters PA-C    Chief Complaint:   Chief Complaint   Patient presents with    Right Knee - Initial Evaluation     Patient presents today for right knee pain for 2 months ago. X-rays performed at [facility] on 12/06/2024. Patient complains of popping and pain around patella area.         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  This been ongoing for around 2 months at this point.  She has noticed popping and catching in her knee at times.  She does notice this when climbing stairs.  She denies any instability.  She states that her symptoms are primarily along the anterior aspect of her knee.  Rest alleviates her symptoms.  She denies any loss of sensation.  She denies any prior injuries in the past.    Past Medical History:   Past Medical History:   Diagnosis Date    Anxiety     Depression         Past Surgical History:  Past Surgical History:   Procedure Laterality Date    TONSILLECTOMY      WISDOM TOOTH EXTRACTION          Social History:   Social History     Socioeconomic History    Marital status: Single   Tobacco Use    Smoking status: Never     Passive exposure: Never    Smokeless tobacco: Never   Vaping Use    Vaping status: Never Used   Substance and Sexual Activity    Alcohol use: Yes     Alcohol/week: 3.0 standard drinks of alcohol     Types: 3 Drinks containing 0.5 oz of alcohol per week    Drug use: Never    Sexual activity: Yes     Partners: Male     Birth control/protection: Condom        Medications:  Current Outpatient Medications   Medication Instructions    busPIRone (BUSPAR) 10 mg, Oral, 3 Times Daily PRN    escitalopram (LEXAPRO) 20 mg, Oral, Daily    ferrous sulfate 325 mg, Daily With Breakfast    Levonorgestrel (MIRENA) 20 MCG/DAY intrauterine device IUD 1 each, Intrauterine, Once    meloxicam (MOBIC) 15 mg, Oral, Daily     neomycin-polymyxin-hydrocortisone (CORTISPORIN) 3.5-57188-7 otic suspension 3 drops, Otic, 3 Times Daily        Allergies:  Allergies   Allergen Reactions    Amoxicillin Unknown (See Comments)    Nickel Itching and Rash       Vital Signs:  There were no vitals filed for this visit.  Body mass index is 31.59 kg/m².     Review of Systems:   Review of Systems    Physical Exam:  Vital signs reviewed.   General: No acute distress. Cooperative with exam.   Eyes: conjunctiva clear; pupils equally round and reactive  ENT: external ears and nose atraumatic; oropharynx clear  CV: no peripheral edema, 2+ distal pulses  Resp: normal respiratory effort, no use of accessory muscles  Skin: no rashes or wounds; normal turgor  Psych: mood and affect appropriate; recent and remote memory intact  Neuro: sensation to light touch intact, no focal neurological deficit  Musculoskeletal: On inspection of the patient's right knee there is no swelling, erythema, ecchymosis.  She has an audible and palpable clunking and grinding sensation with active flexion extension of the knee.  Positive patellar apprehension test.  Otherwise nontender to palpation throughout the knee joint, anterior and posterior aspect.  Range of motion is full, similar to the contralateral side.  No joint instability is present.  Negative Noe test.  Mildly positive Thessaly test.  Neurovascular intact right lower extremity.  Nonantalgic gait    Physical Exam     Results Review:   XR --  XR - 1 Result   I have personally reviewed Results for orders placed during the hospital encounter of 12/06/24    XR KNEE 3 VW RIGHT 12/6/2024   and my interpretation of the image is as follows: An AP lateral PA 45 were obtained by another provider but reviewed in the office by me today.  They do not demonstrate any evidence of acute osseous abnormality.  Bony contour is within normal limits.  No obvious intra-articular loose bodies are present.  No obvious soft tissue abnormalities  present.  Image quality is adequate.    Assessment:   Diagnoses and all orders for this visit:    1. Chronic pain of right knee (Primary)    2. Patellofemoral syndrome, right  -     Ambulatory Referral to Physical Therapy for Evaluation & Treatment    Other orders  -     meloxicam (MOBIC) 15 MG tablet; Take 1 tablet by mouth Daily for 14 days.  Dispense: 14 tablet; Refill: 0         Plan:  The patient's symptoms seem to be consistent with patellar maltracking.  This should improve with a course of physical therapy as well as short-term use of meloxicam 15 mg for 14 days.  Have discussed use of a brace but will hold off on this for now.  May consider an MRI in the future to further evaluate the patellofemoral joint if patient's symptoms persist despite a course of conservative treatment.  She is agreeable with this plan, all questions were answered.    Follow-Up:   Return in about 4 weeks (around 1/13/2025) for Recheck.     Procedure:  Procedures     Bigg Peters PA-C

## 2024-12-20 ENCOUNTER — PATIENT ROUNDING (BHMG ONLY) (OUTPATIENT)
Age: 24
End: 2024-12-20
Payer: COMMERCIAL

## 2024-12-20 NOTE — PROGRESS NOTES
December 20, 2024    Hello, may I speak with Cami England?    My name is INDERJIT      I am  with [facility]  [address] [place] [id number] [phone].    Before we get started may I verify your date of birth? 2000    I am calling to officially welcome you to our practice and ask about your recent visit. Is this a good time to talk? yes    Tell me about your visit with us. What things went well?  STAFF WAS GREAT, AND VERY NICE       We're always looking for ways to make our patients' experiences even better. Do you have recommendations on ways we may improve?  no    Overall were you satisfied with your first visit to our practice? yes       I appreciate you taking the time to speak with me today. Is there anything else I can do for you? no      Thank you, and have a great day.

## 2025-01-07 ENCOUNTER — OFFICE VISIT (OUTPATIENT)
Dept: FAMILY MEDICINE CLINIC | Facility: CLINIC | Age: 25
End: 2025-01-07
Payer: COMMERCIAL

## 2025-01-07 VITALS
OXYGEN SATURATION: 98 % | DIASTOLIC BLOOD PRESSURE: 80 MMHG | WEIGHT: 237 LBS | HEIGHT: 72 IN | SYSTOLIC BLOOD PRESSURE: 140 MMHG | BODY MASS INDEX: 32.1 KG/M2 | HEART RATE: 84 BPM

## 2025-01-07 DIAGNOSIS — F33.41 RECURRENT MAJOR DEPRESSIVE DISORDER, IN PARTIAL REMISSION: ICD-10-CM

## 2025-01-07 DIAGNOSIS — F41.9 ANXIETY: Primary | ICD-10-CM

## 2025-01-07 PROCEDURE — 99214 OFFICE O/P EST MOD 30 MIN: CPT | Performed by: NURSE PRACTITIONER

## 2025-01-07 NOTE — PROGRESS NOTES
"Subjective   Chief Complaint:  Evaluation of depression and anxiety    History of Present Illness  This is a 24-year-old female presenting for 3 evaluation of depression with anxiety show on Lexapro 20 mg daily and BuSpar 10 mg 3 times a day.  Reports symptoms much improved, no suicidal ideation.  No desire to harm self or others.    Past Medical, Surgical, Social, and Family History:  Allergies   Allergen Reactions    Amoxicillin Unknown (See Comments)    Nickel Itching and Rash      Past Medical History:   Diagnosis Date    Anxiety     Depression       Past Surgical History:   Procedure Laterality Date    TONSILLECTOMY      WISDOM TOOTH EXTRACTION        Social History     Socioeconomic History    Marital status: Single   Tobacco Use    Smoking status: Never     Passive exposure: Never    Smokeless tobacco: Never   Vaping Use    Vaping status: Never Used   Substance and Sexual Activity    Alcohol use: Yes     Alcohol/week: 3.0 standard drinks of alcohol     Types: 3 Drinks containing 0.5 oz of alcohol per week    Drug use: Never    Sexual activity: Yes     Partners: Male     Birth control/protection: Condom, I.U.D.      Family History   Problem Relation Age of Onset    Hyperlipidemia Father     Anxiety disorder Mother     Arthritis Mother     Depression Mother     Hyperlipidemia Mother     Diabetes Maternal Grandmother     Cancer Maternal Grandfather     Breast cancer Neg Hx     Ovarian cancer Neg Hx     Uterine cancer Neg Hx     Colon cancer Neg Hx     Melanoma Neg Hx        Objective   Vital Signs  /80   Pulse 84   Ht 182.9 cm (72.01\")   Wt 108 kg (237 lb)   LMP 12/02/2024 (Approximate)   SpO2 98%   BMI 32.13 kg/m²    Physical Exam  Vitals reviewed.   Constitutional:       General: She is not in acute distress.     Appearance: Normal appearance. She is obese.   Cardiovascular:      Rate and Rhythm: Normal rate and regular rhythm.   Pulmonary:      Effort: Pulmonary effort is normal.      Breath " sounds: Normal breath sounds.   Psychiatric:         Behavior: Behavior normal.         Judgment: Judgment normal.       Assessment & Plan   Assessment & Plan  1.  Depression with anxiety, improved and stable  -Continue Lexapro and BuSpar    Follow-up:  The patient will Return for 6 month medication management.    Records and Results Reviewed:  I reviewed current medications as given by patient and allergy list    BMI is >= 30 and <35. (Class 1 Obesity). The following options were offered after discussion;: Information on healthy weight added to patient's after visit summary.    : Hybrid LAURA Co- and Dragon Speech Recognition - No recording technology was used in the exam room during encounter.    Electronically signed by GRISELDA Song, 01/07/25, 9:03 AM CST.

## 2025-02-07 ENCOUNTER — HOSPITAL ENCOUNTER (OUTPATIENT)
Dept: PHYSICAL THERAPY | Facility: HOSPITAL | Age: 25
Setting detail: THERAPIES SERIES
Discharge: HOME | End: 2025-02-07
Payer: COMMERCIAL

## 2025-02-07 DIAGNOSIS — M22.2X1 PATELLOFEMORAL PAIN SYNDROME OF RIGHT KNEE: Primary | ICD-10-CM

## 2025-02-07 PROCEDURE — 97161 PT EVAL LOW COMPLEX 20 MIN: CPT

## 2025-02-07 NOTE — THERAPY EVALUATION
Physical Therapy Initial Evaluation and Plan of Care  [facility]  A department [address] [address]    Patient: Cami England               : 2000  Visit Date: 2025  Referring practitioner: GRISELDA Song  Date of Initial Visit: 2025    Visit Diagnoses:    ICD-10-CM ICD-9-CM   1. Patellofemoral pain syndrome of right knee  M22.2X1 719.46     Past Medical History:   Diagnosis Date    Anxiety     Depression      Past Surgical History:   Procedure Laterality Date    TONSILLECTOMY      WISDOM TOOTH EXTRACTION           SUBJECTIVE     Subjective Evaluation    History of Present Illness  Mechanism of injury: Pt reports that her knee pain has progressed within the past 6 months. She denies any kind of acute injury and states that her pain mainly occurs whenever she spends a lot of time on her feet such as at work. She has the most pain when squatting or going up stairs. Pt reports sitting with her legs crossed also increases pain       Patient Occupation: bedside nursing   Precautions and Work Restrictions: NAQuality of life: good    Pain  Current pain ratin  At best pain ratin  At worst pain ratin  Location: circles her R knee  Quality: sharp (sharp with stairs)  Relieving factors: medications, rest and change in position  Aggravating factors: squatting, stairs and prolonged positioning  Progression: worsening (generally worsening 6 months ; but mobic has made knee feel)    Social Support  Lives in: one-story house (porch steps)    Diagnostic Tests  X-ray: normal (IMPRESSION:   1.  No acute osseous abnormality.)    Treatments  Previous treatment: medication  Patient Goals  Patient goals for therapy: decreased pain, increased motion, independence with ADLs/IADLs and return to work  Patient goal: climbing stairs without need to stop d/t increased pain       Outcome Measure:     PT G-Codes  Outcome Measure Options:  Oxford Knee, Lower Extremity Functional Scale (LEFS)  Total: 74  Oxford knee: 6/48  OBJECTIVE     Objective   LE ROM and MMT Left Right   HIP AROM PROM MMT AROM PROM MMT   Flexion   5   4   Extension         Abduction         IR at 90  60   50    ER at 90  50   40             KNEE         Flexion 5     4+* minor   Extension 5     5            ANKLE         DF 5     5   PF 5     5      Therapy Education/Self Care 18176   Education offered today POC, NOC   Medbride Code    Ongoing HEP      Timed Minutes      Squat assessment:  Symmetrical bilaterally, increased pronation greater on R than left    Ege's test:  Negative both directions    Thessaly Test:  Increase in medial aspect with 20degree squat and inward rotation    Gait: decreased arch bilaterally, R>L    Stairs:   Descending R foot lateral aspect hits first on step  Total Timed Treatment:     0   mins  Total Time of Visit:            40   mins    ASSESSMENT/PLAN     GOALS:  Goals                                          Progress Note due by 3/09/25                                                      Recert due by 5/07/25    Comments Date Status   LTG by: 6 weeks      Ind in HEP      Able to climb 2 flights of stairs with reciprocal gait pattern without HE and no increase in pain      Able to single leg squat with UE support to elevated surface 5x without increase in knee pain to demonstrate increased strength in RLE to decrease risk of injury       Pt will be able to work full 12 hour shift with <4 seated breaks d/t increase in knee pain       R hip rotation mobility within 5 degrees of L hip rotation                Anticipated CPT codes: Therapeutic Exercise 61564, Manual Therapy 15779, Therapeutic Activity 19499, Neuromuscular ReEducation 35819, Self Care/Home Management 69463, Estim Attended 19110, and Estim Unattended [id number]      Assessment & Plan       Assessment  Impairments: abnormal gait, abnormal or restricted ROM, activity intolerance, lacks  appropriate home exercise program and pain with function   Functional limitations: walking, uncomfortable because of pain, sitting and stooping   Assessment details: Cami is a 24 year old female who arrives with complaints of knee pain. Based on her subjective and objective findings from today's session, her pain aligns with patellofemoral pain syndrome of her R knee. In addition to the pain at her knee, she demonstrates faults with hip mobility and her R foot with walking. Though these faults are minor, they likely contribute to her pain at her knee. Skilled therapy would benefit pt in order to improve faulty mechanics to reduce knee pain, improve functional strength in squatting/stooping positions, reduce pain with stair climbing, return to plof without interference by pain.   Prognosis: good    Plan  Therapy options: will be seen for skilled therapy services  Planned modality interventions: low level laser therapy, TENS, high voltage pulsed current (pain management) and dry needling  Planned therapy interventions: abdominal trunk stabilization, gait training, home exercise program, joint mobilization, therapeutic activities, stretching, strengthening, soft tissue mobilization, postural training, neuromuscular re-education, manual therapy and functional ROM exercises  Frequency: 2x week  Duration in weeks: 6  Treatment plan discussed with: patient  Plan details: Manual and modalities to reduce inflammation around knee. Progress with strengthening and mobility activities of RLEs.         SIGNATURE: Melania Rodriguez PT DPT, [place] License #: [id number]  Electronically Signed on 2/7/2025      Initial Certification  Certification Period: 2/7/2025 through 5/7/2025  I certify that the therapy services are furnished while this patient is under my care.  The services outlined above are required by this patient, and will be reviewed every 90 days.     PHYSICIAN: Colten Jiménez APRN (NPI:  [id number])    Signature____________________________________________DATE: _________     Please sign and return via fax to [phone].   [email or web address]

## 2025-02-14 ENCOUNTER — HOSPITAL ENCOUNTER (OUTPATIENT)
Dept: PHYSICAL THERAPY | Facility: HOSPITAL | Age: 25
Setting detail: THERAPIES SERIES
Discharge: HOME | End: 2025-02-14
Payer: COMMERCIAL

## 2025-02-14 DIAGNOSIS — M22.2X1 PATELLOFEMORAL PAIN SYNDROME OF RIGHT KNEE: Primary | ICD-10-CM

## 2025-02-14 PROCEDURE — 97110 THERAPEUTIC EXERCISES: CPT

## 2025-02-14 PROCEDURE — 97140 MANUAL THERAPY 1/> REGIONS: CPT

## 2025-02-14 NOTE — THERAPY TREATMENT NOTE
Physical Therapy Treatment Note  [facility] Outpatient Therapy Services  A department [place] [place], [place] [address]    Patient: Cami England                                                 Visit Date: 2025  :     2000    Referring practitioner:    GRISELDA Song  Date of Initial Visit:          Type: THERAPY  Episode: R knee pain  Number of visits this episode: 2    Visit Diagnoses:    ICD-10-CM ICD-9-CM   1. Patellofemoral pain syndrome of right knee  M22.2X1 719.46     SUBJECTIVE     Subjective:She reports continued pain especially with long periods of standing.      PAIN: 0/10         OBJECTIVE     Objective     Therapeutic Exercises    01965 Units Comments   Assessed B shoulder at rest position/ elevation in standing x 2     Assessed pelvic orientation in standing, HL , and supine x 2     Isometric L pelvic rotation with 55 cm SB x 15     Isometric B hip adduction with blue ball x 15     B LE muscle synergy x 10     B Sonia  + R   Assessed space B navicular to floor  absent        Timed Minutes 45        Manual Therapy     07423  Comments   STM R ITB with blue T bar     Applied Sure Prep, Cover Roll and Leukotape R knee for inhibition of hyperextension                Timed Minutes 15         Therapy Education/Self Care 19731   Education offered today    Medbride Code    Ongoing HEP      Timed Minutes        Total Timed Treatment:     60   mins  Total Time of Visit:             60   mins         ASSESSMENT/PLAN     GOALS  Goals                                          Progress Note due by 3/09/25                                                      Recert due by 25     Comments Date Status   LTG by: 6 weeks         Ind in HEP         Able to climb 2 flights of stairs with reciprocal gait pattern without HE and no increase in pain         Able to single leg squat with UE support to elevated surface 5x without increase in knee pain to demonstrate  increased strength in RLE to decrease risk of injury          Pt will be able to work full 12 hour shift with <4 seated breaks d/t increase in knee pain   reports increased pain with work duties  2/14  Ongoing   R hip rotation mobility within 5 degrees of L hip rotation           Anticipated CPT codes: Therapeutic Exercise 15669, Manual Therapy 42213, Therapeutic Activity 63234, Neuromuscular ReEducation 49400, Self Care/Home Management 63410, Estim Attended 74654, and Estim Unattended 37759      Assessment/Plan     ASSESSMENT:   Initially her pelvis was rotated to the R and the L iliac crest was elevated.In addition she stands flat footed and her limited B arch flattens with WB'ing. She was positive with the R Sonia test and her R knee hyperextends; therefore, she has multiple mechanical stresses on the R LE.    PLAN:   Assess her long term response to the taping application continue STM and IASTM R ITB, gastroc and potentially hamstring and/or quad complexes.    SIGNATURE: David Arroyo PTA, [place] License #: [id number]  Electronically Signed on 2/14/2025        [address] [place] [address]. [address]  [phone]

## 2025-02-18 ENCOUNTER — HOSPITAL ENCOUNTER (OUTPATIENT)
Dept: PHYSICAL THERAPY | Facility: HOSPITAL | Age: 25
Setting detail: THERAPIES SERIES
Discharge: HOME | End: 2025-02-18
Payer: COMMERCIAL

## 2025-02-18 DIAGNOSIS — M22.2X1 PATELLOFEMORAL PAIN SYNDROME OF RIGHT KNEE: Primary | ICD-10-CM

## 2025-02-18 PROCEDURE — 97140 MANUAL THERAPY 1/> REGIONS: CPT

## 2025-02-18 PROCEDURE — 97110 THERAPEUTIC EXERCISES: CPT

## 2025-02-18 NOTE — THERAPY TREATMENT NOTE
Physical Therapy Treatment Note  [facility] Outpatient Therapy Services  [address], [place] [address]    Patient: Cami England                                                 Visit Date: 2025  :     2000    Referring practitioner:    GRISELDA Song  Date of Initial Visit:          Type: THERAPY  Episode: R knee pain  Number of visits this episode: 3    Visit Diagnoses:    ICD-10-CM ICD-9-CM   1. Patellofemoral pain syndrome of right knee  M22.2X1 719.46     SUBJECTIVE     Subjective: Pt reports she is feeling okay today. The knee tape was helpful.       PAIN: 0/10         OBJECTIVE     Objective     Therapeutic Exercises    09110 Units Comments   Towel scrunches R foot     Assessed pelvic orientation in standing, HL , and supine x 2     Isometric adduction      Isometric B hip abduction      R foot arch lifts     Hamstring curls in prone with DB               Timed Minutes 35        Manual Therapy     97197  Comments   STM R ITB with blue T bar                     Timed Minutes 8         Therapy Education/Self Care 97978   Education offered today    Medbride Code    Ongoing HEP   Towel scrunches and arch lifts   Timed Minutes        Total Timed Treatment:     43   mins  Total Time of Visit:             43   mins         ASSESSMENT/PLAN     GOALS  Goals                                          Progress Note due by 3/09/25                                                      Recert due by 25     Comments Date Status   LTG by: 6 weeks         Ind in HEP  Gave foot arch exercises     ongoing   Able to climb 2 flights of stairs with reciprocal gait pattern without HE and no increase in pain         Able to single leg squat with UE support to elevated surface 5x without increase in knee pain to demonstrate increased strength in RLE to decrease risk of injury          Pt will be able to work full 12 hour shift with <4 seated breaks d/t  increase in knee pain   reports increased pain with work duties  2/14  Ongoing   R hip rotation mobility within 5 degrees of L hip rotation           Anticipated CPT codes: Therapeutic Exercise 43024, Manual Therapy 40791, Therapeutic Activity 94778, Neuromuscular ReEducation 52316, Self Care/Home Management 47286, Estim Attended 65911, and Estim Unattended 57508      Assessment/Plan     ASSESSMENT:   Addressed some aspects of the biomechanical stresses on her knee. Provided exercises to address flat foot and educated pt on how anatomical alignment and muscular forces affect knee/pain.     PLAN:   Assess her long term response to the taping application continue STM and IASTM R ITB, gastroc and potentially hamstring and/or quad complexes.    SIGNATURE: Mealnia Rodriguez PT DPT, [place] License #: [id number]  Electronically Signed on 2/18/2025        [address] [place] [address] [place] [address]  [phone]

## 2025-02-21 ENCOUNTER — HOSPITAL ENCOUNTER (OUTPATIENT)
Dept: PHYSICAL THERAPY | Facility: HOSPITAL | Age: 25
Setting detail: THERAPIES SERIES
Discharge: HOME | End: 2025-02-21
Payer: COMMERCIAL

## 2025-02-21 ENCOUNTER — OFFICE VISIT (OUTPATIENT)
Dept: OBSTETRICS AND GYNECOLOGY | Age: 25
End: 2025-02-21
Payer: COMMERCIAL

## 2025-02-21 VITALS
SYSTOLIC BLOOD PRESSURE: 136 MMHG | DIASTOLIC BLOOD PRESSURE: 86 MMHG | HEIGHT: 72 IN | WEIGHT: 234 LBS | BODY MASS INDEX: 31.69 KG/M2

## 2025-02-21 DIAGNOSIS — Z30.431 IUD CHECK UP: ICD-10-CM

## 2025-02-21 DIAGNOSIS — Z01.419 WOMEN'S ANNUAL ROUTINE GYNECOLOGICAL EXAMINATION: Primary | ICD-10-CM

## 2025-02-21 DIAGNOSIS — M22.2X1 PATELLOFEMORAL PAIN SYNDROME OF RIGHT KNEE: Primary | ICD-10-CM

## 2025-02-21 PROCEDURE — 97112 NEUROMUSCULAR REEDUCATION: CPT

## 2025-02-21 PROCEDURE — 97110 THERAPEUTIC EXERCISES: CPT

## 2025-02-21 NOTE — PROGRESS NOTES
Chief Complaint   Patient presents with    Gynecologic Exam     Patient is here for annual well GYN Exam.  Last well GYN exam and pap 2/20/24, normal/-HPV. Mirena IUD placed 6/28/24. Pt feels more depression recently x 3 weeks, increased Lexapro, denies SI/HI. Patient denies current pelvic pain, abnormal vaginal bleeding or discharge, dyspareunia, and voices no other complaints.        History:  Cami England is a 24 y.o. female who presents today for evaluation of the above problems.       Cami England is a 24 y.o. female ,  who comes to the office today for annual GYN examination.  Last menstrual period was 01/22/2025.   Periods are regular but improved with IUD  Her last Pap smear was 2024, and was normal.   Declines STD screening  Contraception-IUD  Her medical history is reviewed.           ROS:  Review of Systems   Constitutional: Negative.    HENT: Negative.     Eyes: Negative.    Respiratory: Negative.     Cardiovascular: Negative.    Gastrointestinal: Negative.    Endocrine: Negative.    Genitourinary: Negative.    Musculoskeletal: Negative.    Skin: Negative.    Neurological: Negative.    Psychiatric/Behavioral: Negative.  Positive for depressed mood (managed by PCP).        Allergies   Allergen Reactions    Amoxicillin Unknown (See Comments)    Nickel Itching and Rash     Past Medical History:   Diagnosis Date    Anxiety     Depression      Past Surgical History:   Procedure Laterality Date    TONSILLECTOMY      WISDOM TOOTH EXTRACTION       Family History   Problem Relation Age of Onset    Hyperlipidemia Father     Anxiety disorder Mother     Arthritis Mother     Depression Mother     Hyperlipidemia Mother     Diabetes Maternal Grandmother     Cancer Maternal Grandfather     Breast cancer Neg Hx     Ovarian cancer Neg Hx     Uterine cancer Neg Hx     Colon cancer Neg Hx     Melanoma Neg Hx       reports that she has never smoked. She has never been exposed to tobacco smoke. She has never used  "smokeless tobacco. She reports current alcohol use of about 3.0 standard drinks of alcohol per week. She reports that she does not use drugs.      Current Outpatient Medications:     busPIRone (BUSPAR) 10 MG tablet, Take 1 tablet by mouth 3 (Three) Times a Day As Needed (Anxiety)., Disp: 90 tablet, Rfl: 5    escitalopram (Lexapro) 20 MG tablet, Take 1 tablet by mouth Daily., Disp: 30 tablet, Rfl: 12    ferrous sulfate 325 (65 FE) MG tablet, Take 1 tablet by mouth Daily With Breakfast., Disp: , Rfl:     neomycin-polymyxin-hydrocortisone (CORTISPORIN) 3.5-22643-5 otic suspension, Administer 3 drops into ear(s) as directed by provider 3 (Three) Times a Day., Disp: 10 mL, Rfl: 0    Levonorgestrel (MIRENA) 20 MCG/DAY intrauterine device IUD, To be inserted one time by prescriber. Route intrauterine., Disp: 1 each, Rfl: 0    OBJECTIVE:  /86   Ht 182.9 cm (72\")   Wt 106 kg (234 lb)   LMP 01/22/2025 (Approximate)   BMI 31.74 kg/m²    Physical Exam  Exam conducted with a chaperone present.   Constitutional:       Appearance: She is well-developed.   HENT:      Head: Normocephalic and atraumatic.   Eyes:      General: Lids are normal.      Conjunctiva/sclera: Conjunctivae normal.      Pupils: Pupils are equal, round, and reactive to light.   Neck:      Thyroid: No thyromegaly.   Cardiovascular:      Rate and Rhythm: Normal rate and regular rhythm.      Heart sounds: Normal heart sounds.   Pulmonary:      Effort: Pulmonary effort is normal.      Breath sounds: Normal breath sounds.   Chest:   Breasts:     Breasts are symmetrical.      Right: No inverted nipple, mass, nipple discharge, skin change or tenderness.      Left: No inverted nipple, mass, nipple discharge, skin change or tenderness.   Abdominal:      General: Bowel sounds are normal.      Palpations: Abdomen is soft.   Genitourinary:     Exam position: Supine.      Labia:         Right: No rash, tenderness, lesion or injury.         Left: No rash, " tenderness, lesion or injury.       Vagina: No signs of injury and foreign body. No vaginal discharge, erythema, tenderness or bleeding.      Cervix: No cervical motion tenderness, discharge or friability.      Uterus: Not deviated, not enlarged, not fixed and not tender.       Adnexa:         Right: No mass, tenderness or fullness.          Left: No mass, tenderness or fullness.        Rectum: Normal. No tenderness or external hemorrhoid.   Musculoskeletal:         General: Normal range of motion.      Cervical back: Normal range of motion and neck supple.   Skin:     General: Skin is warm and dry.   Neurological:      Mental Status: She is alert and oriented to person, place, and time.         Assessment/Plan    Diagnoses and all orders for this visit:    1. Women's annual routine gynecological examination (Primary)    2. IUD check up  Comments:  no strings today    Preventative and Immunizations:      - Tetanus: Unknown or >10 years ago. Recommend to have at pharmacy or on injury.      - Influenza: recommended annually      - Pneumovax:once after age 65      - Prevnar: Once after age 65      - Zostavax: Once after age 60  Colon Cancer Screening: Due at 45  Mammogram: Due at 40.  PAP: due 2027  DEXA:  BMD testing (DXA) in all women 65 years of age and older and in postmenopausal women younger than 65 years of age with clinical risk factors for fracture   COVID vaccine information is available at vaccine.[place].gov   Additional preventative recommendations in AVS.    She will return for GYN US for IUD check since strings not visible on exam today. In the meantime if she develops questions or problems, she will notify the office.        An After Visit Summary was printed and given to the patient at discharge.  Return for IUD check US strings lost.          Naty Felipe APRN 2/21/2025   Electronically signed

## 2025-02-21 NOTE — THERAPY TREATMENT NOTE
Physical Therapy Treatment Note  [facility] Outpatient Therapy Services  A department [facility]  [address], [place] [address]    Patient: Cami England                                                 Visit Date: 2025  :     2000    Referring practitioner:    GRISELDA Song  Date of Initial Visit:          Type: THERAPY  Episode: R knee pain  Number of visits this episode: 4    Visit Diagnoses:    ICD-10-CM ICD-9-CM   1. Patellofemoral pain syndrome of right knee  M22.2X1 719.46     SUBJECTIVE     Subjective: States she has been feeling good since last time, noting she will have a catch every now and then if she steps weird.      PAIN: 0/10     OBJECTIVE     Objective     Therapeutic Exercises    06385 Units Comments   Squats w/ SB @ wall x15    RLE shuttle press jumps 6 bands 2x20    Seated knee flex w/ blue band x15 Slow and controlled   SLR lateral foot taps over cone w/ 4# 2x10     Monster slides w/ 5# & blue TB on knees 2x5 Fwd/bwd        Timed Minutes 28        Neuromuscular Reeducation     40524 Comments   RLE SLS w/ LLE on wall holding SB, foam/no foam Random perturbations against SB   TRX slider lunges bwd/lat X10 ea       Timed Minutes 12         Therapy Education/Self Care 42107   Education offered today    Tristiane Code    Ongoing HEP   Towel scrunches and arch lifts   Timed Minutes        Total Timed Treatment:     40   mins  Total Time of Visit:             40   mins         ASSESSMENT/PLAN     GOALS  Goals                                          Progress Note due by 3/09/25                                                      Recert due by 25     Comments Date Status   LTG by: 6 weeks         Ind in HEP  Gave foot arch exercises     ongoing   Able to climb 2 flights of stairs with reciprocal gait pattern without HE and no increase in pain         Able to single leg squat with UE support to elevated surface 5x without increase in knee pain to  demonstrate increased strength in RLE to decrease risk of injury          Pt will be able to work full 12 hour shift with <4 seated breaks d/t increase in knee pain   reports increased pain with work duties  2/14  Ongoing   R hip rotation mobility within 5 degrees of L hip rotation           Anticipated CPT codes: Therapeutic Exercise 54007, Manual Therapy 18669, Therapeutic Activity 04839, Neuromuscular ReEducation 98052, Self Care/Home Management 86814, Estim Attended 05372, and Estim Unattended 18246      Assessment/Plan     ASSESSMENT:   Pt reported she has not had any real issues since last visit, noting she does get occasional catches if she sits or steps a certain way. She noted no increased discomfort during exercises, but did note some fatigue following treatment.    PLAN:   Assess her long term response to the taping application continue STM and IASTM R ITB, gastroc and potentially hamstring and/or quad complexes.    SIGNATURE: Alban Hutton PTA, [place] License #: [id number]  Electronically Signed on 2/21/2025        [address] [place] [address] [place] [address]  [phone]

## 2025-02-24 ENCOUNTER — OFFICE VISIT (OUTPATIENT)
Dept: OBSTETRICS AND GYNECOLOGY | Age: 25
End: 2025-02-24
Payer: COMMERCIAL

## 2025-02-24 ENCOUNTER — APPOINTMENT (OUTPATIENT)
Dept: PHYSICAL THERAPY | Facility: HOSPITAL | Age: 25
End: 2025-02-24
Payer: COMMERCIAL

## 2025-02-24 VITALS
WEIGHT: 236 LBS | SYSTOLIC BLOOD PRESSURE: 122 MMHG | HEIGHT: 72 IN | DIASTOLIC BLOOD PRESSURE: 82 MMHG | BODY MASS INDEX: 31.97 KG/M2 | RESPIRATION RATE: 18 BRPM

## 2025-02-24 DIAGNOSIS — Z30.431 IUD CHECK UP: Primary | ICD-10-CM

## 2025-02-24 PROCEDURE — 99212 OFFICE O/P EST SF 10 MIN: CPT | Performed by: NURSE PRACTITIONER

## 2025-02-24 NOTE — PROGRESS NOTES
"Chief Complaint   Patient presents with    Follow-up     Patient present today with follow up of IUD placement. Strings were not seen at last visit  US Today         History:  Cami England is a 24 y.o. female who presents today for follow-up for evaluation of the above:    HPI    Patient presents today for US for IUD check due to no strings seen on annual exam.         ROS:  Review of Systems   Constitutional: Negative.    HENT: Negative.     Eyes: Negative.    Respiratory: Negative.     Cardiovascular: Negative.    Gastrointestinal: Negative.    Endocrine: Negative.    Genitourinary: Negative.    Musculoskeletal: Negative.    Skin: Negative.    Neurological: Negative.    Psychiatric/Behavioral: Negative.         Ms. England  reports that she has never smoked. She has never been exposed to tobacco smoke. She has never used smokeless tobacco. She reports current alcohol use of about 3.0 standard drinks of alcohol per week. She reports that she does not use drugs.      Current Outpatient Medications:     busPIRone (BUSPAR) 10 MG tablet, Take 1 tablet by mouth 3 (Three) Times a Day As Needed (Anxiety)., Disp: 90 tablet, Rfl: 5    escitalopram (Lexapro) 20 MG tablet, Take 1 tablet by mouth Daily., Disp: 30 tablet, Rfl: 12    ferrous sulfate 325 (65 FE) MG tablet, Take 1 tablet by mouth Daily With Breakfast., Disp: , Rfl:     neomycin-polymyxin-hydrocortisone (CORTISPORIN) 3.5-67916-7 otic suspension, Administer 3 drops into ear(s) as directed by provider 3 (Three) Times a Day., Disp: 10 mL, Rfl: 0    Levonorgestrel (MIRENA) 20 MCG/DAY intrauterine device IUD, To be inserted one time by prescriber. Route intrauterine., Disp: 1 each, Rfl: 0      OBJECTIVE:  /82   Resp 18   Ht 182.9 cm (72\")   Wt 107 kg (236 lb)   LMP 02/22/2025 (Exact Date)   BMI 32.01 kg/m²    Physical Exam  Constitutional:       Appearance: She is not ill-appearing.   Pulmonary:      Effort: No respiratory distress.   Neurological:    "   Mental Status: She is oriented to person, place, and time.   Psychiatric:         Behavior: Behavior normal.         Assessment/Plan    Diagnoses and all orders for this visit:    1. IUD check up (Primary)  Comments:  US is reviewed with the patient in the office and IUD in normal position         An After Visit Summary was printed and given to the patient at discharge.  Return if symptoms worsen or fail to improve, for Next scheduled follow up. Sooner if problems arise.          Naty VILLALOBOS. 2/24/2025   Electronically Signed

## 2025-02-28 ENCOUNTER — HOSPITAL ENCOUNTER (OUTPATIENT)
Dept: PHYSICAL THERAPY | Facility: HOSPITAL | Age: 25
Setting detail: THERAPIES SERIES
Discharge: HOME | End: 2025-02-28
Payer: COMMERCIAL

## 2025-02-28 DIAGNOSIS — M22.2X1 PATELLOFEMORAL PAIN SYNDROME OF RIGHT KNEE: Primary | ICD-10-CM

## 2025-02-28 PROCEDURE — 97140 MANUAL THERAPY 1/> REGIONS: CPT

## 2025-02-28 PROCEDURE — 97110 THERAPEUTIC EXERCISES: CPT

## 2025-02-28 NOTE — THERAPY TREATMENT NOTE
Physical Therapy Treatment Note and 30 Day Progress Note  [facility] Outpatient Therapy Services  A department [facility]  [address], [place] [address]    Patient: Cami England                                                 Visit Date: 2025  :     2000    Referring practitioner:    GRISELDA Song  Date of Initial Visit:          Type: THERAPY  Episode: R knee pain  Number of visits this episode: 5    Visit Diagnoses:    ICD-10-CM ICD-9-CM   1. Patellofemoral pain syndrome of right knee  M22.2X1 719.46     SUBJECTIVE     Subjective:She denies any pain she still has a catching sensation, had fatigue       PAIN: 0/10         OBJECTIVE     Objective     Therapeutic Exercises    07554 Units Comments   Assessed B hip ER and IR AROM     Reviewed all goals for progress note     R SL squat x 5 with UE support     Prone R LE hip ext with 90 degree knee flexion x 10          Timed Minutes 20      Manual Therapy     60364  Comments   STM R ITB with blue T bar L SL   TMR R hamstring complex prone               Timed Minutes 25           Therapy Education/Self Care 05710   Education offered today    Medbride Code    Ongoing HEP   Towel scrunches and arch lifts    Timed Minutes        Total Timed Treatment:     45   mins  Total Time of Visit:             45   mins         ASSESSMENT/PLAN     GOALS  Goals                                          Progress Note due by 3/30/25                                                      Recert due by 25     Comments Date Status   LTG by: 6 weeks         Ind in HEP reinforced    ongoing   Able to climb 2 flights of stairs with reciprocal gait pattern without HE and no increase in pain No issues today did exhibit increased R foot pronation supination     Ongoing   Able to single leg squat with UE support to elevated surface 5x without increase in knee pain to demonstrate increased strength in RLE to decrease risk of injury  R LE squat  supported x 5  2/28  Ongoing   Pt will be able to work full 12 hour shift with <4 seated breaks d/t increase in knee pain  She reports not really taking and no increase   2/28  Ongoing   R hip rotation mobility within 5 degrees of L hip rotation  L ER 60 IR 30 degrees R ER 58 IR 28  2/28  Ongoing     Anticipated CPT codes: Therapeutic Exercise 45115, Manual Therapy 60332, Therapeutic Activity 70930, Neuromuscular ReEducation 97510, Self Care/Home Management 66993, and Estim Attended 17544      Assessment/Plan     ASSESSMENT:   Her R foot tends to roll a bit which changes how she reacts to an increased GFR at R midstance to toe off. In addition, she has a weaker gluteus medius on the L which increases her GFR at R heel strike. Her R gluteus genevieve is a little weaker in comparison and she is a little more hamstring dominant on that side setting up increased tightness of the R hamstrings and ITB    PLAN:   Increased R hamstring and ITB flexibility, L gluteus medius and R gluteus genevieve strength as well as consider a trial of R scaphoid pad in her shoe.    SIGNATURE: David Arroyo [facility] [place], [place] License #: [id number]  Electronically Signed on 2/28/2025        [address] [place] [address]. [address]  [phone]

## 2025-03-03 ENCOUNTER — HOSPITAL ENCOUNTER (OUTPATIENT)
Dept: PHYSICAL THERAPY | Facility: HOSPITAL | Age: 25
Setting detail: THERAPIES SERIES
Discharge: HOME | End: 2025-03-03
Payer: COMMERCIAL

## 2025-03-03 DIAGNOSIS — M22.2X1 PATELLOFEMORAL PAIN SYNDROME OF RIGHT KNEE: Primary | ICD-10-CM

## 2025-03-03 PROCEDURE — 97110 THERAPEUTIC EXERCISES: CPT

## 2025-03-03 NOTE — THERAPY TREATMENT NOTE
Progress Note Addendum    Patient: Cami England           : 2000  Visit Date: 2025  Referring practitioner: GRISELDA Song  Date of Initial Visit: Type: THERAPY  Episode: R knee pain    Visit Diagnoses:    ICD-10-CM ICD-9-CM   1. Patellofemoral pain syndrome of right knee  M22.2X1 719.46          Clinical Progress: improved  Home Program Compliance: Yes  Progress toward previous goals: Partially Met  Prognosis to achieve goals: good    Objective     Assessment & Plan       Assessment  Impairments: abnormal gait, abnormal or restricted ROM, activity intolerance, lacks appropriate home exercise program and pain with function   Functional limitations: walking, uncomfortable because of pain, sitting and stooping   Prognosis: good    Plan  Therapy options: will be seen for skilled therapy services  Planned modality interventions: low level laser therapy, TENS, high voltage pulsed current (pain management) and dry needling  Planned therapy interventions: abdominal trunk stabilization, gait training, home exercise program, joint mobilization, therapeutic activities, stretching, strengthening, soft tissue mobilization, postural training, neuromuscular re-education, manual therapy and functional ROM exercises  Frequency: 2x week  Duration in weeks: 6  Treatment plan discussed with: PTA        Anticipated CPT codes: Gait Training 08504, Therapeutic Exercise 28895, Manual Therapy 09293, Therapeutic Activity 84830, Neuromuscular ReEducation 03987, Self Care/Home Management 04474, and Estim Attended [id number]    I reviewed the treatment and goals with David Arroyo PTA and agree with the POC.    SIGNATURE: Abad Whitman PT, License #: [id number]  Electronically Signed on 3/3/2025

## 2025-03-03 NOTE — THERAPY TREATMENT NOTE
Physical Therapy Treatment Note  [facility]  A department [address] [place] [address], [place] [address]    Patient: Cami England                                                 Visit Date: 3/3/2025  :     2000    Referring practitioner:    GRISELDA Song  Date of Initial Visit:          Type: THERAPY  Episode: R knee pain  Number of visits this episode: 6    Visit Diagnoses:    ICD-10-CM ICD-9-CM   1. Patellofemoral pain syndrome of right knee  M22.2X1 719.46     SUBJECTIVE     Subjective:Pt is feeling okay today. She is not really having any pain unless she crossed her R leg      PAIN: 0/10         OBJECTIVE     Objective     Therapeutic Exercises    52761 Units Comments   Hamstring stretch mercy 3 x 30 sec ea     Single leg glute bridge with opposite LE on therapy ball 3 x 10     Quadruped hip extension with iso hamstring contraction using blue therapy ball 2 x 10 ea    SL single leg on shuttle press with hip abduction  2 x 10 ea         Timed Minutes 40          Therapy Education/Self Care 03472   Education offered today    Medbride Code    Ongoing HEP   Towel scrunches and arch lifts    Timed Minutes        Total Timed Treatment:     40   mins  Total Time of Visit:             40   mins         ASSESSMENT/PLAN     GOALS  Goals                                          Progress Note due by 3/30/25                                                      Recert due by 25     Comments Date Status   LTG by: 6 weeks         Ind in HEP reinforced    ongoing   Able to climb 2 flights of stairs with reciprocal gait pattern without HE and no increase in pain No issues today did exhibit increased R foot pronation supination     Ongoing   Able to single leg squat with UE support to elevated surface 5x without increase in knee pain to demonstrate increased strength in RLE to decrease risk of injury  R LE squat supported x 5    Ongoing   Pt will be  able to work full 12 hour shift with <4 seated breaks d/t increase in knee pain  She reports not really taking and no increase   2/28  Ongoing   R hip rotation mobility within 5 degrees of L hip rotation  L ER 60 IR 30 degrees R ER 58 IR 28  2/28  Ongoing     Anticipated CPT codes: Therapeutic Exercise 22331, Manual Therapy 59342, Therapeutic Activity 73980, Neuromuscular ReEducation 50613, Self Care/Home Management 61651, and Estim Attended 78033      Assessment/Plan     ASSESSMENT:   Continued exercises to address hamstrings and glutes today to improve strength to reduce pull factors on the knee. Pt tolerated activities well with the exception of increased knee pain on shuttle press machine prior to realigning body    PLAN:   Increased R hamstring and ITB flexibility, L gluteus medius and R gluteus genevieve strength as well as consider a trial of R scaphoid pad in her shoe.    SIGNATURE: Melania Rodriguez PT DPT, [place] License #: [id number]  Electronically Signed on 3/3/2025        [facility] [place] [address] [place] [address]  [phone]

## 2025-03-06 ENCOUNTER — APPOINTMENT (OUTPATIENT)
Dept: PHYSICAL THERAPY | Facility: HOSPITAL | Age: 25
End: 2025-03-06
Payer: COMMERCIAL

## 2025-03-07 ENCOUNTER — OFFICE VISIT (OUTPATIENT)
Dept: FAMILY MEDICINE CLINIC | Facility: CLINIC | Age: 25
End: 2025-03-07
Payer: COMMERCIAL

## 2025-03-07 VITALS
HEART RATE: 82 BPM | OXYGEN SATURATION: 98 % | TEMPERATURE: 98.4 F | DIASTOLIC BLOOD PRESSURE: 74 MMHG | SYSTOLIC BLOOD PRESSURE: 120 MMHG | WEIGHT: 236.4 LBS | BODY MASS INDEX: 32.06 KG/M2

## 2025-03-07 DIAGNOSIS — R09.89 RESPIRATORY SYMPTOMS: ICD-10-CM

## 2025-03-07 DIAGNOSIS — B34.9 VIRAL ILLNESS: ICD-10-CM

## 2025-03-07 DIAGNOSIS — F41.9 ANXIETY: Primary | ICD-10-CM

## 2025-03-07 LAB
EXPIRATION DATE: 0
FLUAV AG UPPER RESP QL IA.RAPID: NOT DETECTED
FLUBV AG UPPER RESP QL IA.RAPID: NOT DETECTED
INTERNAL CONTROL: NORMAL
Lab: 0
SARS-COV-2 AG UPPER RESP QL IA.RAPID: NOT DETECTED

## 2025-03-07 NOTE — LETTER
GRISELDA Wilcox  [address] [address]  Phone: [phone]  Fax: [phone]      PATIENT NAME: Cami England                                                                          YOB: [birth year]    To whom it may concern,    Cami England should be excused from work 3/8/2025.          This document has been signed by GRISELDA Wilcox on March 7, 2025 13:55 CST

## 2025-03-07 NOTE — PROGRESS NOTES
Subjective   Chief Complaint:  Increased anxiety    History of Present Illness  The patient is a 24-year-old female presenting for medication management.    She has a history of anxiety and reports increased struggles with her current medication regimen, which she feels is not working well. She is currently on Lexapro 20 mg daily and BuSpar 10 mg three times a day.    Additionally, she has been experiencing some nausea, head spinning, and body aches for a few days.    MEDICATIONS  Lexapro, BuSpar    Past Medical, Surgical, Social, and Family History:  Allergies   Allergen Reactions    Amoxicillin Unknown (See Comments)    Nickel Itching and Rash      Past Medical History:   Diagnosis Date    Anxiety     Depression       Past Surgical History:   Procedure Laterality Date    TONSILLECTOMY      WISDOM TOOTH EXTRACTION        Social History     Socioeconomic History    Marital status: Single   Tobacco Use    Smoking status: Never     Passive exposure: Never    Smokeless tobacco: Never   Vaping Use    Vaping status: Never Used   Substance and Sexual Activity    Alcohol use: Yes     Alcohol/week: 3.0 standard drinks of alcohol     Types: 3 Drinks containing 0.5 oz of alcohol per week    Drug use: Never    Sexual activity: Not Currently     Partners: Male     Birth control/protection: I.U.D.      Family History   Problem Relation Age of Onset    Hyperlipidemia Father     Anxiety disorder Mother     Arthritis Mother     Depression Mother     Hyperlipidemia Mother     Diabetes Maternal Grandmother     Cancer Maternal Grandfather     Breast cancer Neg Hx     Ovarian cancer Neg Hx     Uterine cancer Neg Hx     Colon cancer Neg Hx     Melanoma Neg Hx        Objective   Vital Signs  /74   Pulse 82   Temp 98.4 °F (36.9 °C)   Wt 107 kg (236 lb 6.4 oz)   LMP 02/22/2025 (Exact Date)   SpO2 98%   BMI 32.06 kg/m²    Physical Exam  Vitals reviewed.   Constitutional:       General: She is not in acute distress.      Appearance: She is obese. She is ill-appearing (tired appearing).   Cardiovascular:      Rate and Rhythm: Normal rate and regular rhythm.   Pulmonary:      Effort: Pulmonary effort is normal.      Breath sounds: Normal breath sounds.   Psychiatric:         Behavior: Behavior normal.         Judgment: Judgment normal.       Assessment & Plan   Assessment & Plan  1. Respiratory symptoms.  A combination influenza and COVID-19 test will be conducted today.    2. Chronic anxiety, currently unstable.  Discontinue Lexapro and transition to Prozac 20 mg daily. Continue BuSpar 10 mg three times a day.    3. Viral illness.  Advised home supportive care. A work note provided for tomorrow. Informed that both influenza and COVID-19 tests are negative.    Follow-up:  The patient will Return for 1 month reevaluation of med change - lexapro to prozac.    Records and Results Reviewed:  I reviewed current medications as given by patient and allergy list.    : Hybrid LAURA Co- and Dragon Speech Recognition - No recording technology was used in the exam room during encounter.    Electronically signed by GRISELDA Song, 03/07/25, 2:39 PM CST.

## 2025-03-10 ENCOUNTER — APPOINTMENT (OUTPATIENT)
Dept: PHYSICAL THERAPY | Facility: HOSPITAL | Age: 25
End: 2025-03-10
Payer: COMMERCIAL

## 2025-03-12 ENCOUNTER — HOSPITAL ENCOUNTER (OUTPATIENT)
Dept: PHYSICAL THERAPY | Facility: HOSPITAL | Age: 25
Setting detail: THERAPIES SERIES
Discharge: HOME | End: 2025-03-12
Payer: COMMERCIAL

## 2025-03-12 DIAGNOSIS — M22.2X1 PATELLOFEMORAL PAIN SYNDROME OF RIGHT KNEE: Primary | ICD-10-CM

## 2025-03-12 PROCEDURE — 97535 SELF CARE MNGMENT TRAINING: CPT

## 2025-03-12 PROCEDURE — 97110 THERAPEUTIC EXERCISES: CPT

## 2025-03-12 NOTE — THERAPY TREATMENT NOTE
Physical Therapy Treatment Note  [facility]  A department [address] [place], [place] [address]    Patient: Cami England                                                 Visit Date: 3/12/2025  :     2000    Referring practitioner:    GRISELDA Song  Date of Initial Visit:          Type: THERAPY  Episode: R knee pain  Number of visits this episode: 7    Visit Diagnoses:    ICD-10-CM ICD-9-CM   1. Patellofemoral pain syndrome of right knee  M22.2X1 719.46     SUBJECTIVE     Subjective: Pt has not mario to PT for about a week d/ tsickness and she hasn't done much. Her knee is feeling fine today.       PAIN: 0/10         OBJECTIVE     Objective     Therapeutic Exercises    73569 Units Comments   Hamstring stretch mercy 3 x 30 sec ea     Single leg squat with TRX straps  Difficulty- modified   SL squat with opp LE on furniture slider with elevated HL table 2 x 10 ea    SL single leg on shuttle press with hip abduction  2 x 10 ea    Modified side plank with iso hip abduction  Difficult on L compared to R   Timed Minutes 31          Therapy Education/Self Care 74151   Education offered today Educated pt on new HEP and scaphoid pad to trial in her R work shoe    Medbride Code    Ongoing HEP   Access Code: [email or web address]  URL: [email or web address]  Date: 2025  Prepared by: Melania Rodriguez    Exercises  - Modified Side Plank with Hip Abduction  - 1 x daily - 7 x weekly - 3 sets - 10 reps  - Single Leg Squat with Chair Touch  - 1 x daily - 7 x weekly - 3 sets - 10 reps  - Single Leg Bridge  - 1 x daily - 7 x weekly - 3 sets - 10 reps  - Hip Abduction with Resistance Loop  - 1 x daily - 7 x weekly - 3 sets - 10 reps  - Arch Lifting  - 1 x daily - 7 x weekly - 3 sets - 10 reps  - Towel Scrunches  - 1 x daily - 7 x weekly - 3 sets - 10 reps   Timed Minutes 9       Total Timed Treatment:     40   mins  Total Time of Visit:             40   mins          ASSESSMENT/PLAN     GOALS  Goals                                          Progress Note due by 3/30/25                                                      Recert due by 5/07/25     Comments Date Status   LTG by: 6 weeks         Ind in HEP reinforced  2/28  ongoing   Able to climb 2 flights of stairs with reciprocal gait pattern without HE and no increase in pain No issues today did exhibit increased R foot pronation supination   2/28  Ongoing   Able to single leg squat with UE support to elevated surface 5x without increase in knee pain to demonstrate increased strength in RLE to decrease risk of injury  R LE squat supported x 5  2/28  Ongoing   Pt will be able to work full 12 hour shift with <4 seated breaks d/t increase in knee pain  She reports not really taking and no increase   2/28  Ongoing   R hip rotation mobility within 5 degrees of L hip rotation  L ER 60 IR 30 degrees R ER 58 IR 28  2/28  Ongoing     Anticipated CPT codes: Therapeutic Exercise 86724, Manual Therapy 97643, Therapeutic Activity 47582, Neuromuscular ReEducation 07110, Self Care/Home Management 83966, and Estim Attended 62476      Assessment/Plan     ASSESSMENT:   Assessed ihp mm strength with single leg activities. Noted significant difference between side plank + iso hip abduction hold in L leg vs R leg. Provided pt with HEP and reviewed today. Also discussed trialing scaphoid pad in R work shoe to adjust mechanics of feet in addition to performing strength exercises to R foot to take stress off of knee and allow for less stress with certain functional activities such as squats.     PLAN:   Increased R hamstring and ITB flexibility, L gluteus medius and R gluteus genevieve strength as well as consider a trial of R scaphoid pad in her shoe.    SIGNATURE: Melania Rodriguez PT DPT, [place] License #: [id number]  Electronically Signed on 3/12/2025        [facility] [place] [address]  [phone]

## 2025-03-17 ENCOUNTER — HOSPITAL ENCOUNTER (OUTPATIENT)
Dept: PHYSICAL THERAPY | Facility: HOSPITAL | Age: 25
Setting detail: THERAPIES SERIES
Discharge: HOME | End: 2025-03-17
Payer: COMMERCIAL

## 2025-03-17 DIAGNOSIS — M22.2X1 PATELLOFEMORAL PAIN SYNDROME OF RIGHT KNEE: Primary | ICD-10-CM

## 2025-03-17 PROCEDURE — 97140 MANUAL THERAPY 1/> REGIONS: CPT

## 2025-03-17 PROCEDURE — 97110 THERAPEUTIC EXERCISES: CPT

## 2025-03-17 NOTE — THERAPY TREATMENT NOTE
Physical Therapy Treatment Note  [facility]  [address], [place] [address]    Patient: Cami England                                                 Visit Date: 3/17/2025  :     2000    Referring practitioner:    GRISELDA Song  Date of Initial Visit:          Type: THERAPY  Episode: R knee pain  Number of visits this episode: 8    Visit Diagnoses:    ICD-10-CM ICD-9-CM   1. Patellofemoral pain syndrome of right knee  M22.2X1 719.46     SUBJECTIVE     Subjective:She used the scaphoid pad during a 12 hour shift and her foot was sore       PAIN: 0/10         OBJECTIVE     Objective     Therapeutic Exercises    72500 Units Comments   R LE LAQ with #4 ball between knees for VMO activation x 20     Resisted gait with TRX Rip Trainer x 100 ft x 2     Resisted B side steps with TRX Rip Trainer x 50 ft each     Resisted retro walk with TRX Rip Trainer x 50 ft. X 2          Timed Minutes 27        Manual Therapy     10017  Comments   STM R ITB with blue T bar  L SL   Assessed R hamstring length    R hip inferior lateral glides Grade 2 followed by ER and IR stretches             Timed Minutes 14         Therapy Education/Self Care 03829   Education offered today    Medbride Code    Ongoing HEP   Access Code: [email or web address]  URL: [email or web address]  Date: 2025  Prepared by: Melania Rodriguez     Exercises  - Modified Side Plank with Hip Abduction  - 1 x daily - 7 x weekly - 3 sets - 10 reps  - Single Leg Squat with Chair Touch  - 1 x daily - 7 x weekly - 3 sets - 10 reps  - Single Leg Bridge  - 1 x daily - 7 x weekly - 3 sets - 10 reps  - Hip Abduction with Resistance Loop  - 1 x daily - 7 x weekly - 3 sets - 10 reps  - Arch Lifting  - 1 x daily - 7 x weekly - 3 sets - 10 reps  - Towel Scrunches  - 1 x daily - 7 x weekly - 3 sets - 10 reps   Timed Minutes        Total Timed Treatment:     41   mins  Total Time of Visit:              41   mins         ASSESSMENT/PLAN     GOALS  Goals                                          Progress Note due by 3/30/25                                                      Recert due by 5/07/25     Comments Date Status   LTG by: 6 weeks         Ind in HEP reinforced  3/17  ongoing   Able to climb 2 flights of stairs with reciprocal gait pattern without HE and no increase in pain No issues today did exhibit increased R foot pronation supination   2/28  Ongoing   Able to single leg squat with UE support to elevated surface 5x without increase in knee pain to demonstrate increased strength in RLE to decrease risk of injury  R LE squat supported x 5  2/28  Ongoing   Pt will be able to work full 12 hour shift with <4 seated breaks d/t increase in knee pain  She reports not really taking and no increase   2/28  Ongoing   R hip rotation mobility within 5 degrees of L hip rotation  L ER 60 IR 30 degrees R ER 58 IR 28  2/28  Ongoing     Anticipated CPT codes: Gait Training 72012, Therapeutic Exercise 33604, Manual Therapy 19176, Therapeutic Activity 37442, Neuromuscular ReEducation 65379, Self Care/Home Management 01109, Estim Attended 57990, and Estim Unattended 46780      Assessment/Plan     ASSESSMENT:   She was able to maintain the R hamstring mobility and R hip IR from a previous session. She tends to intermittently have a NBOS when walking as well.     PLAN:   Increased R hamstring and ITB flexibility, L gluteus medius and R gluteus genevieve strength.    SIGNATURE: David Arroyo PTA, [place] License #: [id number]  Electronically Signed on 3/17/2025        [address] [place] [address]. [address]  [phone]

## 2025-03-20 ENCOUNTER — HOSPITAL ENCOUNTER (OUTPATIENT)
Dept: PHYSICAL THERAPY | Facility: HOSPITAL | Age: 25
Setting detail: THERAPIES SERIES
Discharge: HOME | End: 2025-03-20
Payer: COMMERCIAL

## 2025-03-20 DIAGNOSIS — M22.2X1 PATELLOFEMORAL PAIN SYNDROME OF RIGHT KNEE: Primary | ICD-10-CM

## 2025-03-20 PROCEDURE — 97110 THERAPEUTIC EXERCISES: CPT

## 2025-03-20 NOTE — THERAPY TREATMENT NOTE
Physical Therapy Treatment Note and Discharge Note  [facility]  A department [address] [place] [address], [place] [address]    Patient: Cami England                                                 Visit Date: 3/20/2025  :     2000    Referring practitioner:    GRISELDA Song  Date of Initial Visit:          Type: THERAPY  Episode: R knee pain  Number of visits this episode: 9    Visit Diagnoses:    ICD-10-CM ICD-9-CM   1. Patellofemoral pain syndrome of right knee  M22.2X1 719.46     SUBJECTIVE     Subjective:Pt is tired. She denies pain at this point and she reports she went to the gym without any increase in knee pain.       PAIN: 0/10         OBJECTIVE     Objective     Therapeutic Exercises    25860 Units Comments   Progress note assessment/discharge     R and L  LE LAQ with #4 ball between knees for VMO activation x 20     R & L LE LAQ with isometric abduction with black theraband x 20 ea      SL squats with furniture slider           Timed Minutes 40        DISCHARGE SUMMARY   Discharge date 3/20/2025   Dates of this episode 25 through 3/20/25   Number of visits on this episode 9   Reason for discharge all goals met   Outcomes achieved Refer to the goals table for specifics on goals  Able to achieve all goals   Discharge plan Continue with current home exercise program as instructed  Continue to strengthen LE independently    Discharge instruction See Education Table             Therapy Education/Self Care 26488   Education offered today    Medbride Code    Ongoing HEP   Access Code: [email or web address]  URL: [email or web address]  Date: 2025  Prepared by: Melania Rodriguez     Exercises  - Modified Side Plank with Hip Abduction  - 1 x daily - 7 x weekly - 3 sets - 10 reps  - Single Leg Squat with Chair Touch  - 1 x daily - 7 x weekly - 3 sets - 10 reps  - Single Leg Bridge  - 1 x daily - 7 x weekly - 3 sets - 10 reps  - Hip Abduction with  Resistance Loop  - 1 x daily - 7 x weekly - 3 sets - 10 reps  - Arch Lifting  - 1 x daily - 7 x weekly - 3 sets - 10 reps  - Towel Scrunches  - 1 x daily - 7 x weekly - 3 sets - 10 reps   Timed Minutes        Total Timed Treatment:     40   mins  Total Time of Visit:             40   mins         ASSESSMENT/PLAN     GOALS  Goals                                          Progress Note due by 3/30/25                                                      Recert due by 5/07/25     Comments Date Status   LTG by: 6 weeks         Ind in HEP reinforced  3/17  ongoing   Able to climb 2 flights of stairs with reciprocal gait pattern without HE and no increase in pain Pt denies pain with stair climbing- is aware of corrections provided at previous sessions  3/20  MET   Able to single leg squat with UE support to elevated surface 5x without increase in knee pain to demonstrate increased strength in RLE to decrease risk of injury  Performed modified SL squat with slider and elevated HL table without increase in pain  3/20  MET   Pt will be able to work full 12 hour shift with <4 seated breaks d/t increase in knee pain  Reported no increase in pain, does continue to wear scaphoid pads 3/20  MET   R hip rotation mobility within 5 degrees of L hip rotation  L ER49  L IR 53    R ER 48  R IR 50  3/20  Met multiple readings     Anticipated CPT codes: Gait Training 50356, Therapeutic Exercise 21471, Manual Therapy 61987, Therapeutic Activity 02169, Neuromuscular ReEducation 91490, Self Care/Home Management 06515, Estim Attended 57896, and Estim Unattended 54144      Assessment/Plan     ASSESSMENT:   Pt has met all goals at this point in time and has been pain free for multiple sessions. She feels as though she has progressed in therapy and is okay with DC today. She has been educated on biomechanical and anatomical stressors on the knee that can impact her pain, and she understands the purpose of provided exercises. At this time, I am  comfortable with pt continuing her exercises at the gym and may return if any other issues arise.     PLAN:   DC today .    SIGNATURE: Melania Rodriguez PT DPT, [place] License #: [id number]  Electronically Signed on 3/20/2025        [address] [facility] [place] [address]  [phone]

## 2025-04-07 ENCOUNTER — OFFICE VISIT (OUTPATIENT)
Dept: FAMILY MEDICINE CLINIC | Facility: CLINIC | Age: 25
End: 2025-04-07
Payer: COMMERCIAL

## 2025-04-07 VITALS
TEMPERATURE: 96.9 F | HEART RATE: 78 BPM | SYSTOLIC BLOOD PRESSURE: 128 MMHG | BODY MASS INDEX: 33.21 KG/M2 | WEIGHT: 245.2 LBS | HEIGHT: 72 IN | DIASTOLIC BLOOD PRESSURE: 88 MMHG | OXYGEN SATURATION: 98 %

## 2025-04-07 DIAGNOSIS — F41.9 ANXIETY: Primary | ICD-10-CM

## 2025-04-07 NOTE — PROGRESS NOTES
"Subjective   Chief Complaint:  Reevaluation of anxiety    History of Present Illness  The patient is a 24-year-old female presenting today for reevaluation of anxiety.    She was switched from Lexapro to Prozac 20 mg daily. She reports significant improvement in her target symptoms and overall well-being. No adverse effects from the medication.    MEDICATIONS  Prozac    Past Medical, Surgical, Social, and Family History:  Allergies   Allergen Reactions    Amoxicillin Unknown (See Comments)    Nickel Itching and Rash      Past Medical History:   Diagnosis Date    Anxiety     Depression       Past Surgical History:   Procedure Laterality Date    TONSILLECTOMY      WISDOM TOOTH EXTRACTION        Social History     Socioeconomic History    Marital status: Single   Tobacco Use    Smoking status: Never     Passive exposure: Never    Smokeless tobacco: Never   Vaping Use    Vaping status: Never Used   Substance and Sexual Activity    Alcohol use: Yes     Alcohol/week: 3.0 standard drinks of alcohol     Types: 3 Drinks containing 0.5 oz of alcohol per week    Drug use: Never    Sexual activity: Not Currently     Partners: Male     Birth control/protection: I.U.D.      Family History   Problem Relation Age of Onset    Hyperlipidemia Father     Diabetes Father     Anxiety disorder Mother     Arthritis Mother     Depression Mother     Hyperlipidemia Mother     Diabetes Maternal Grandmother     Cancer Maternal Grandfather     Breast cancer Neg Hx     Ovarian cancer Neg Hx     Uterine cancer Neg Hx     Colon cancer Neg Hx     Melanoma Neg Hx        Objective   Vital Signs  /88   Pulse 78   Temp 96.9 °F (36.1 °C) (Infrared)   Ht 182.9 cm (72\")   Wt 111 kg (245 lb 3.2 oz)   SpO2 98%   BMI 33.26 kg/m²    Physical Exam  Vitals reviewed.   Constitutional:       General: She is not in acute distress.     Appearance: Normal appearance. She is obese.   Cardiovascular:      Rate and Rhythm: Normal rate and regular rhythm. "   Pulmonary:      Effort: Pulmonary effort is normal.      Breath sounds: Normal breath sounds.   Psychiatric:         Behavior: Behavior normal.         Judgment: Judgment normal.       Assessment & Plan   Assessment & Plan  1. Chronic anxiety.  Her chronic anxiety has shown significant improvement and is currently stable. She will maintain her current regimen of Prozac 20 mg daily.    Follow-up:  The patient will Return for 6 month medication management.    Records and Results Reviewed:  I reviewed current medications as given by patient and allergy list.    : Hybrid LAURA Co- and Dragon Speech Recognition - No recording technology was used in the exam room during encounter.    Electronically signed by GRISELDA Song, 04/07/25, 10:02 AM CDT.

## 2025-08-29 DIAGNOSIS — F41.9 ANXIETY: ICD-10-CM
